# Patient Record
Sex: MALE | Race: WHITE | NOT HISPANIC OR LATINO | Employment: OTHER
[De-identification: names, ages, dates, MRNs, and addresses within clinical notes are randomized per-mention and may not be internally consistent; named-entity substitution may affect disease eponyms.]

---
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
## 2024-02-28 ENCOUNTER — OFFICE VISIT (OUTPATIENT)
Dept: SURGERY | Facility: CLINIC | Age: 19
End: 2024-02-28
Payer: COMMERCIAL

## 2024-02-28 VITALS
BODY MASS INDEX: 42.66 KG/M2 | HEIGHT: 72 IN | WEIGHT: 315 LBS | DIASTOLIC BLOOD PRESSURE: 64 MMHG | SYSTOLIC BLOOD PRESSURE: 116 MMHG

## 2024-02-28 DIAGNOSIS — E88.810 METABOLIC SYNDROME X: ICD-10-CM

## 2024-02-28 DIAGNOSIS — R62.50 DEVELOPMENTAL DELAY: ICD-10-CM

## 2024-02-28 DIAGNOSIS — E66.01 MORBID OBESITY WITH BMI OF 50.0-59.9, ADULT (H): Primary | ICD-10-CM

## 2024-02-28 PROCEDURE — 99214 OFFICE O/P EST MOD 30 MIN: CPT | Performed by: EMERGENCY MEDICINE

## 2024-02-28 NOTE — LETTER
2/28/2024         RE: Nnamdi Chavarria  [address] [place] [address]        Dear Colleague,    Thank you for referring your patient, Nnamdi Chavarria, to the [facility] [place]. Please see a copy of my visit note below.    Bariatric Clinic Follow-Up Visit:    Nnamdi Chavarria is a 18 year old  male with Body mass index is 57.76 kg/m .  presenting here today for follow-up on non-surgical efforts for weight loss. Original Intake visit occurred on 7/14/23 with a weight of 408 lbs and BMI of 56.1 with co-morbid fatty liver disease.  Along with diet and behavior changes, he has been using metformin and naltrexone to assist his weight loss goals.  He had trouble with medication compliance early on/daily use and out our 10/27/23 follow up had had limited success in managing appetite well.  As such we attempted to ramp up Wegovy at that visit (shortages currently can impeded access/supply) and he had not been able to start it as of his 11/15/23 dietician visit. 2/28/24 he is gaining weight as a result of poor control of his hunger disorder. We discussed start of Zepbound (tirzepatide) given lack of axis to both Wegovy and Saxenda, previous poor efficacy of phentermine/topamax (qsymia) and liraglutide in the past.  He struggles with remembering meds at his age/cognitive delay issues and single weekly dose is likely to improve compliance/efficacy with medications like Zepbound..      See his intake visit notes for details on identified contributors to weight gain in the past. Chart review shows 11/15/23 Dietician calculated RMR of 2730kcal/day and protein intake goal of 120g/day.    Weight:   Wt Readings from Last 5 Encounters:   02/28/24 (!) 190.5 kg (420 lb) (>99%, Z= 4.03)*   11/15/23 (!) 181 kg (399 lb) (>99%, Z= 3.86)*   10/27/23 (!) 185.1 kg (408 lb) (>99%, Z= 3.91)*   07/14/23 (!) 185.1 kg (408 lb) (>99%, Z= 3.87)*   06/11/20 (!) 161.1 kg (355 lb 2.6 oz) (>99%, Z=  3.94)*     * Growth percentiles are based on CDC (Boys, 2-20 Years) data.    pounds      Comorbidities:  Patient Active Problem List   Diagnosis     Severe obesity (BMI >= 40) (H)     Acanthosis nigricans     Developmental delay     History of seizures     Weakness of both lower extremities     Family history of chromosomal anomaly     Pain in both lower extremities     Scoliosis, unspecified scoliosis type, unspecified spinal region   Vitamin D Deficiency Screening Results:  Lab Results   Component Value Date    VITDT 18 (L) 07/14/2023    VITDT 21 06/11/2020    VITDT 15 (L) 06/13/2019    VITDT 23 06/21/2018         Current Outpatient Medications:      [START ON 5/24/2024] tirzepatide-Weight Management (ZEPBOUND) 10 MG/0.5ML prefilled pen, Inject 0.5 mLs (10 mg) Subcutaneous every 7 days for 180 days, Disp: 6 mL, Rfl: 1     [START ON 3/1/2024] tirzepatide-Weight Management (ZEPBOUND) 2.5 MG/0.5ML prefilled pen, Inject 0.5 mLs (2.5 mg) Subcutaneous every 7 days for 28 days, Disp: 2 mL, Rfl: 0     [START ON 3/29/2024] tirzepatide-Weight Management (ZEPBOUND) 5 MG/0.5ML prefilled pen, Inject 0.5 mLs (5 mg) Subcutaneous every 7 days for 28 days, Disp: 2 mL, Rfl: 0     [START ON 4/26/2024] tirzepatide-Weight Management (ZEPBOUND) 7.5 MG/0.5ML prefilled pen, Inject 0.5 mLs (7.5 mg) Subcutaneous every 7 days for 28 days, Disp: 2 mL, Rfl: 0     Semaglutide-Weight Management (WEGOVY) 2.4 MG/0.75ML pen, Inject 2.4 mg Subcutaneous every 7 days for 270 days 4 pens (Patient not taking: Reported on 2/28/2024), Disp: 3 mL, Rfl: 9      Interim: Since our last visit, he has not been able to find Wegovy. Struggling with hunger. Sedentary habits continued but tries to get out for occasional walks.     Plan:  To get back on track we're looking for 600-650kcal meals with 30-35 grams of lean protein. Start meals with protein first, then enjoy a variety of vegetables and complex carbohydrates. Meals every 5-6 hours will keep your energy  up and nourishment adequate.    2. Wegovy shortages have prevented use of this medication. Given previous intolerance of phentermine, low efficacy and compliance difficulty with metformin/naltrexone and daily compliance issues with Saxenda, we'll ramp up Zepbound if tolerated well and affordable: start 2.5mg/week for 4 weeks then 5mg/week for 4 weeks then 7.5mg/week for 4 weeks and then 10mg/week for 3-6 months before looking at further dose adjustments.    Stop Zepbound if any surgery needed, about 2-3 weeks prior to surgery ideally. Restart once feeling well. If more than 3-4 weeks off meds, we'll likely need to re-ramp.    Stop zepbound if severe abdominal pain/vomiting/unable to nourish your body.  See handout below.    3. Find 10-20 minutes to move your body for increase good health, aiming to build up to 150minutes or more weekly by July 4th.     4. Follow up with dietician.    5. I recommend a tracking moreno for mindfulness and to make sure your well nourished and hitting protein target of 120g/day and about 2300kcal/day (bare minimum of 1950).       We discussed [facility]ariat[facility] Basics including:  -eating 3 meals daily  -reviewed metabolic needs for weight loss based on Resting Metabolic Rate  -protein goals supportive of healthy weight loss  -avoiding/limiting calorie containing beverages  -We discussed the importance of restorative sleep and stress management in maintaining a healthy weight.  -We discussed the National Weight Control Registry healthy weight maintenance strategies and ways to optimize metabolism.  -We discussed the importance of physical activity including cardiovascular and strength training in maintaining a healthier weight and explored viable options.      Most recent labs:  Lab Results   Component Value Date    WBC 8.5 07/14/2023    HGB 13.3 07/14/2023    HCT 37.9 (L) 07/14/2023    MCV 84 07/14/2023     07/14/2023     Lab Results   Component Value Date    CHOL 89 06/11/2020  "    Lab Results   Component Value Date    HDL 37 (L) 06/11/2020     No components found for: \"LDLCALC\"  Lab Results   Component Value Date    TRIG 141 (H) 06/11/2020     No results found for: \"CHOLHDL\"  Lab Results   Component Value Date    ALT 47 07/14/2023    AST 32 07/14/2023    ALKPHOS 58 07/14/2023     No results found for: \"HGBA1C\"  Lab Results   Component Value Date    B12 580 07/14/2023     No components found for: \"VITDT1\"  No results found for: \"SADIA\"  Lab Results   Component Value Date    PTHI 38 07/14/2023     No results found for: \"ZN\"  No results found for: \"VIB1WB\"  Lab Results   Component Value Date    TSH 1.79 07/14/2023     No results found for: \"TEST\"    DIETARY HISTORY  Reviewed goals. Trying to stay away from liquid calories.      PHYSICAL ACTIVITY PATTERNS:  Cardiovascular: walks limited distances  Strength Training: no    REVIEW OF SYSTEMS  Depends on mom for many responses..  PHYSICAL EXAM:  Vitals: /64 (BP Location: Right arm, Patient Position: Sitting, Cuff Size: Adult Small)   Ht 1.816 m (5' 11.5\")   Wt (!) 190.5 kg (420 lb)   BMI 57.76 kg/m    Weight:   Wt Readings from Last 3 Encounters:   02/28/24 (!) 190.5 kg (420 lb) (>99%, Z= 4.03)*   11/15/23 (!) 181 kg (399 lb) (>99%, Z= 3.86)*   10/27/23 (!) 185.1 kg (408 lb) (>99%, Z= 3.91)*     * Growth percentiles are based on CDC (Boys, 2-20 Years) data.         GEN: Pleasant, well groomed, in no acute distress  HEENT:  acanthosis nigricans to face/neck .  NECK: No swelling.  HEART: RRR.  LUNGS: No respiratory difficulty noted. No cough. .  ABDOMEN: morbid obesity. Nontender abdomen. Reviewed areas of concern for pancreatitis, biliary stones or constipation. No distention..  EXTREMITIES: No tremor. Ambulation is independent..  NEURO: Alert, quiet, limited responses..  SKIN: No visible rashes. .    Interim study results:   Lab Results   Component Value Date    A1C 5.3 07/14/2023    A1C 5.2 06/11/2020    A1C 4.8 06/13/2019    A1C 5.4 " 06/21/2018     Last Comprehensive Metabolic Panel:  Sodium   Date Value Ref Range Status   07/14/2023 143 136 - 145 mmol/L Final   06/11/2020 141 133 - 143 mmol/L Final     Potassium   Date Value Ref Range Status   07/14/2023 4.1 3.4 - 5.3 mmol/L Final   06/11/2020 3.7 3.4 - 5.3 mmol/L Final     Chloride   Date Value Ref Range Status   07/14/2023 107 98 - 107 mmol/L Final   06/11/2020 110 98 - 110 mmol/L Final     Carbon Dioxide   Date Value Ref Range Status   06/11/2020 26 20 - 32 mmol/L Final     Carbon Dioxide (CO2)   Date Value Ref Range Status   07/14/2023 24 22 - 29 mmol/L Final     Anion Gap   Date Value Ref Range Status   07/14/2023 12 7 - 15 mmol/L Final   06/11/2020 5 3 - 14 mmol/L Final     Glucose   Date Value Ref Range Status   07/14/2023 106 (H) 70 - 99 mg/dL Final   06/11/2020 74 70 - 99 mg/dL Final     Urea Nitrogen   Date Value Ref Range Status   07/14/2023 8.7 6.0 - 20.0 mg/dL Final   06/11/2020 10 7 - 21 mg/dL Final     Creatinine   Date Value Ref Range Status   07/14/2023 0.78 0.67 - 1.17 mg/dL Final   06/11/2020 0.80 0.50 - 1.00 mg/dL Final     GFR Estimate   Date Value Ref Range Status   07/14/2023 >90 >60 mL/min/1.73m2 Final   06/11/2020 GFR not calculated, patient <18 years old. >60 mL/min/[1.73_m2] Final     Comment:     Non  GFR Calc  Starting 12/18/2018, serum creatinine based estimated GFR (eGFR) will be   calculated using the Chronic Kidney Disease Epidemiology Collaboration   (CKD-EPI) equation.       Calcium   Date Value Ref Range Status   07/14/2023 9.4 8.6 - 10.0 mg/dL Final   06/11/2020 9.0 8.5 - 10.1 mg/dL Final     Bilirubin Total   Date Value Ref Range Status   07/14/2023 0.5 <=1.2 mg/dL Final     Alkaline Phosphatase   Date Value Ref Range Status   07/14/2023 58 40 - 129 U/L Final     ALT   Date Value Ref Range Status   07/14/2023 47 0 - 50 U/L Final     Comment:     Reference intervals for this test were updated on 6/12/2023 to more accurately reflect our  healthy population. There may be differences in the flagging of prior results with similar values performed with this method. Interpretation of those prior results can be made in the context of the updated reference intervals.     06/11/2020 60 (H) 0 - 50 U/L Final     AST   Date Value Ref Range Status   07/14/2023 32 0 - 35 U/L Final     Comment:     Reference intervals for this test were updated on 6/12/2023 to more accurately reflect our healthy population. There may be differences in the flagging of prior results with similar values performed with this method. Interpretation of those prior results can be made in the context of the updated reference intervals.   06/11/2020 30 0 - 35 U/L Final               TSH   Date Value Ref Range Status   07/14/2023 1.79 0.50 - 4.30 uIU/mL Final     Recent Labs   Lab Test 06/11/20  1040 06/21/18  1501   CHOL 89 62   HDL 37* 34*   LDL 24 17   TRIG 141* 57     .      35 minutes spent by me on the date of the encounter doing chart review, history and exam, documentation and further activities per the note   Elias Palma MD  [facility]  7:57 AM  2/28/2024      Again, thank you for allowing me to participate in the care of your patient.        Sincerely,        Elias Palma MD

## 2024-02-28 NOTE — PROGRESS NOTES
Bariatric Clinic Follow-Up Visit:    Nnamdi Chavarria is a 18 year old  male with Body mass index is 57.76 kg/m .  presenting here today for follow-up on non-surgical efforts for weight loss. Original Intake visit occurred on 7/14/23 with a weight of 408 lbs and BMI of 56.1 with co-morbid fatty liver disease.  Along with diet and behavior changes, he has been using metformin and naltrexone to assist his weight loss goals.  He had trouble with medication compliance early on/daily use and out our 10/27/23 follow up had had limited success in managing appetite well.  As such we attempted to ramp up Wegovy at that visit (shortages currently can impeded access/supply) and he had not been able to start it as of his 11/15/23 dietician visit. 2/28/24 he is gaining weight as a result of poor control of his hunger disorder. We discussed start of Zepbound (tirzepatide) given lack of axis to both Wegovy and Saxenda, previous poor efficacy of phentermine/topamax (qsymia) and liraglutide in the past.  He struggles with remembering meds at his age/cognitive delay issues and single weekly dose is likely to improve compliance/efficacy with medications like Zepbound..      See his intake visit notes for details on identified contributors to weight gain in the past. Chart review shows 11/15/23 Dietician calculated RMR of 2730kcal/day and protein intake goal of 120g/day.    Weight:   Wt Readings from Last 5 Encounters:   02/28/24 (!) 190.5 kg (420 lb) (>99%, Z= 4.03)*   11/15/23 (!) 181 kg (399 lb) (>99%, Z= 3.86)*   10/27/23 (!) 185.1 kg (408 lb) (>99%, Z= 3.91)*   07/14/23 (!) 185.1 kg (408 lb) (>99%, Z= 3.87)*   06/11/20 (!) 161.1 kg (355 lb 2.6 oz) (>99%, Z= 3.94)*     * Growth percentiles are based on CDC (Boys, 2-20 Years) data.    pounds      Comorbidities:  Patient Active Problem List   Diagnosis    Severe obesity (BMI >= 40) (H)    Acanthosis nigricans    Developmental delay    History of seizures    Weakness of both lower  extremities    Family history of chromosomal anomaly    Pain in both lower extremities    Scoliosis, unspecified scoliosis type, unspecified spinal region   Vitamin D Deficiency Screening Results:  Lab Results   Component Value Date    VITDT 18 (L) 07/14/2023    VITDT 21 06/11/2020    VITDT 15 (L) 06/13/2019    VITDT 23 06/21/2018         Current Outpatient Medications:     [START ON 5/24/2024] tirzepatide-Weight Management (ZEPBOUND) 10 MG/0.5ML prefilled pen, Inject 0.5 mLs (10 mg) Subcutaneous every 7 days for 180 days, Disp: 6 mL, Rfl: 1    [START ON 3/1/2024] tirzepatide-Weight Management (ZEPBOUND) 2.5 MG/0.5ML prefilled pen, Inject 0.5 mLs (2.5 mg) Subcutaneous every 7 days for 28 days, Disp: 2 mL, Rfl: 0    [START ON 3/29/2024] tirzepatide-Weight Management (ZEPBOUND) 5 MG/0.5ML prefilled pen, Inject 0.5 mLs (5 mg) Subcutaneous every 7 days for 28 days, Disp: 2 mL, Rfl: 0    [START ON 4/26/2024] tirzepatide-Weight Management (ZEPBOUND) 7.5 MG/0.5ML prefilled pen, Inject 0.5 mLs (7.5 mg) Subcutaneous every 7 days for 28 days, Disp: 2 mL, Rfl: 0    Semaglutide-Weight Management (WEGOVY) 2.4 MG/0.75ML pen, Inject 2.4 mg Subcutaneous every 7 days for 270 days 4 pens (Patient not taking: Reported on 2/28/2024), Disp: 3 mL, Rfl: 9      Interim: Since our last visit, he has not been able to find Wegovy. Struggling with hunger. Sedentary habits continued but tries to get out for occasional walks.     Plan:  To get back on track we're looking for 600-650kcal meals with 30-35 grams of lean protein. Start meals with protein first, then enjoy a variety of vegetables and complex carbohydrates. Meals every 5-6 hours will keep your energy up and nourishment adequate.    2. Wegovy shortages have prevented use of this medication. Given previous intolerance of phentermine, low efficacy and compliance difficulty with metformin/naltrexone and daily compliance issues with Saxenda, we'll ramp up Zepbound if tolerated well and  "affordable: start 2.5mg/week for 4 weeks then 5mg/week for 4 weeks then 7.5mg/week for 4 weeks and then 10mg/week for 3-6 months before looking at further dose adjustments.    Stop Zepbound if any surgery needed, about 2-3 weeks prior to surgery ideally. Restart once feeling well. If more than 3-4 weeks off meds, we'll likely need to re-ramp.    Stop zepbound if severe abdominal pain/vomiting/unable to nourish your body.  See handout below.    3. Find 10-20 minutes to move your body for increase good health, aiming to build up to 150minutes or more weekly by July 4th.     4. Follow up with dietician.    5. I recommend a tracking moreno for mindfulness and to make sure your well nourished and hitting protein target of 120g/day and about 2300kcal/day (bare minimum of 1950).       We discussed [facility] Basics including:  -eating 3 meals daily  -reviewed metabolic needs for weight loss based on Resting Metabolic Rate  -protein goals supportive of healthy weight loss  -avoiding/limiting calorie containing beverages  -We discussed the importance of restorative sleep and stress management in maintaining a healthy weight.  -We discussed the National Weight Control Registry healthy weight maintenance strategies and ways to optimize metabolism.  -We discussed the importance of physical activity including cardiovascular and strength training in maintaining a healthier weight and explored viable options.      Most recent labs:  Lab Results   Component Value Date    WBC 8.5 07/14/2023    HGB 13.3 07/14/2023    HCT 37.9 (L) 07/14/2023    MCV 84 07/14/2023     07/14/2023     Lab Results   Component Value Date    CHOL 89 06/11/2020     Lab Results   Component Value Date    HDL 37 (L) 06/11/2020     No components found for: \"LDLCALC\"  Lab Results   Component Value Date    TRIG 141 (H) 06/11/2020     No results found for: \"CHOLHDL\"  Lab Results   Component Value Date    ALT 47 07/14/2023    AST 32 07/14/2023    " "ALKPHOS 58 07/14/2023     No results found for: \"HGBA1C\"  Lab Results   Component Value Date    B12 580 07/14/2023     No components found for: \"VITDT1\"  No results found for: \"SADIA\"  Lab Results   Component Value Date    PTHI 38 07/14/2023     No results found for: \"ZN\"  No results found for: \"VIB1WB\"  Lab Results   Component Value Date    TSH 1.79 07/14/2023     No results found for: \"TEST\"    DIETARY HISTORY  Reviewed goals. Trying to stay away from liquid calories.      PHYSICAL ACTIVITY PATTERNS:  Cardiovascular: walks limited distances  Strength Training: no    REVIEW OF SYSTEMS  Depends on mom for many responses..  PHYSICAL EXAM:  Vitals: /64 (BP Location: Right arm, Patient Position: Sitting, Cuff Size: Adult Small)   Ht 1.816 m (5' 11.5\")   Wt (!) 190.5 kg (420 lb)   BMI 57.76 kg/m    Weight:   Wt Readings from Last 3 Encounters:   02/28/24 (!) 190.5 kg (420 lb) (>99%, Z= 4.03)*   11/15/23 (!) 181 kg (399 lb) (>99%, Z= 3.86)*   10/27/23 (!) 185.1 kg (408 lb) (>99%, Z= 3.91)*     * Growth percentiles are based on CDC (Boys, 2-20 Years) data.         GEN: Pleasant, well groomed, in no acute distress  HEENT:  acanthosis nigricans to face/neck .  NECK: No swelling.  HEART: RRR.  LUNGS: No respiratory difficulty noted. No cough. .  ABDOMEN: morbid obesity. Nontender abdomen. Reviewed areas of concern for pancreatitis, biliary stones or constipation. No distention..  EXTREMITIES: No tremor. Ambulation is independent..  NEURO: Alert, quiet, limited responses..  SKIN: No visible rashes. .    Interim study results:   Lab Results   Component Value Date    A1C 5.3 07/14/2023    A1C 5.2 06/11/2020    A1C 4.8 06/13/2019    A1C 5.4 06/21/2018     Last Comprehensive Metabolic Panel:  Sodium   Date Value Ref Range Status   07/14/2023 143 136 - 145 mmol/L Final   06/11/2020 141 133 - 143 mmol/L Final     Potassium   Date Value Ref Range Status   07/14/2023 4.1 3.4 - 5.3 mmol/L Final   06/11/2020 3.7 3.4 - 5.3 mmol/L " Final     Chloride   Date Value Ref Range Status   07/14/2023 107 98 - 107 mmol/L Final   06/11/2020 110 98 - 110 mmol/L Final     Carbon Dioxide   Date Value Ref Range Status   06/11/2020 26 20 - 32 mmol/L Final     Carbon Dioxide (CO2)   Date Value Ref Range Status   07/14/2023 24 22 - 29 mmol/L Final     Anion Gap   Date Value Ref Range Status   07/14/2023 12 7 - 15 mmol/L Final   06/11/2020 5 3 - 14 mmol/L Final     Glucose   Date Value Ref Range Status   07/14/2023 106 (H) 70 - 99 mg/dL Final   06/11/2020 74 70 - 99 mg/dL Final     Urea Nitrogen   Date Value Ref Range Status   07/14/2023 8.7 6.0 - 20.0 mg/dL Final   06/11/2020 10 7 - 21 mg/dL Final     Creatinine   Date Value Ref Range Status   07/14/2023 0.78 0.67 - 1.17 mg/dL Final   06/11/2020 0.80 0.50 - 1.00 mg/dL Final     GFR Estimate   Date Value Ref Range Status   07/14/2023 >90 >60 mL/min/1.73m2 Final   06/11/2020 GFR not calculated, patient <18 years old. >60 mL/min/[1.73_m2] Final     Comment:     Non  GFR Calc  Starting 12/18/2018, serum creatinine based estimated GFR (eGFR) will be   calculated using the Chronic Kidney Disease Epidemiology Collaboration   (CKD-EPI) equation.       Calcium   Date Value Ref Range Status   07/14/2023 9.4 8.6 - 10.0 mg/dL Final   06/11/2020 9.0 8.5 - 10.1 mg/dL Final     Bilirubin Total   Date Value Ref Range Status   07/14/2023 0.5 <=1.2 mg/dL Final     Alkaline Phosphatase   Date Value Ref Range Status   07/14/2023 58 40 - 129 U/L Final     ALT   Date Value Ref Range Status   07/14/2023 47 0 - 50 U/L Final     Comment:     Reference intervals for this test were updated on 6/12/2023 to more accurately reflect our healthy population. There may be differences in the flagging of prior results with similar values performed with this method. Interpretation of those prior results can be made in the context of the updated reference intervals.     06/11/2020 60 (H) 0 - 50 U/L Final     AST   Date Value Ref  Range Status   07/14/2023 32 0 - 35 U/L Final     Comment:     Reference intervals for this test were updated on 6/12/2023 to more accurately reflect our healthy population. There may be differences in the flagging of prior results with similar values performed with this method. Interpretation of those prior results can be made in the context of the updated reference intervals.   06/11/2020 30 0 - 35 U/L Final               TSH   Date Value Ref Range Status   07/14/2023 1.79 0.50 - 4.30 uIU/mL Final     Recent Labs   Lab Test 06/11/20  1040 06/21/18  1501   CHOL 89 62   HDL 37* 34*   LDL 24 17   TRIG 141* 57     .      35 minutes spent by me on the date of the encounter doing chart review, history and exam, documentation and further activities per the note   Elias Palma MD  [facility]  7:57 AM  2/28/2024

## 2024-02-28 NOTE — PATIENT INSTRUCTIONS
Plan:  To get back on track we're looking for 600-650kcal meals with 30-35 grams of lean protein. Start meals with protein first, then enjoy a variety of vegetables and complex carbohydrates. Meals every 5-6 hours will keep your energy up and nourishment adequate.    2. Wegovy shortages have prevented use of this medication. Given previous intolerance of phentermine, low efficacy and compliance difficulty with metformin/naltrexone and daily compliance issues with Saxenda, we'll ramp up Zepbound if tolerated well and affordable: start 2.5mg/week for 4 weeks then 5mg/week for 4 weeks then 7.5mg/week for 4 weeks and then 10mg/week for 3-6 months before looking at further dose adjustments.    Stop Zepbound if any surgery needed, about 2-3 weeks prior to surgery ideally. Restart once feeling well. If more than 3-4 weeks off meds, we'll likely need to re-ramp.    Stop zepbound if severe abdominal pain/vomiting/unable to nourish your body.  See handout below.    3. Find 10-20 minutes to move your body for increase good health, aiming to build up to 150minutes or more weekly by July 4th.     4. Follow up with dietician.    5. I recommend a tracking moreno for mindfulness and to make sure your well nourished and hitting protein target of 120g/day and about 2300kcal/day (bare minimum of 1950).       LEAN PROTEIN SOURCES  Getting 20-30 grams of protein, 3 meals daily, is appropriate for most people, some need more but more than about 40 grams per meal is not useful.  General rule is drinking one ounce of water per gram of protein eaten over the course of the day:  70 grams of protein each day, drink 70 oz of water.  Protein Source Portion Calories Grams of Protein                           Nonfat, plain Greek yogurt    (10 grams sugar or less) 3/4 cup (6 oz)  12-17   Light Yogurt (10 grams sugar or less) 3/4 cup (6 oz)  6-8   Protein Shake 1 shake 110-180 15-30   Skim/1% Milk or lactose-free milk 1 cup ( 8 oz)   8   Plain or light, flavored soymilk 1 cup  7-8   Plain or light, hemp milk 1 cup 110 6   Fat Free or 1% Cottage Cheese 1/2 cup 90 15   Part skim ricotta cheese 1/2 cup 100 14   Part skim or reduced fat cheese slices 1 ounce 65-80 8     Mozzarella String Cheese 1 80 8   Canned tuna, chicken, crab or salmon  (canned in water)  1/2 cup 100 15-20   White fish (broiled, grilled, baked) 3 ounces 100 21   [place]/Tuna (broiled, grilled, baked) 3 ounces 150-180 21   Shrimp, Scallops, Lobster, Crab 3 ounces 100 21   Pork loin, Pork Tenderloin 3 ounces 150 21   Boneless, skinless chicken /turkey breast                          (broiled, grilled, baked) 3 ounces 120 21   [place], Hand, [place], and Venison 3 ounces 120 21   Lean cuts of red meat and pork (sirloin,   round, tenderloin, flank, ground 93%-96%) 3 ounces 170 21   Lean or Extra Lean Ground Turkey 1/2 cup 150 20   90-95% Lean [place] Burger 1 lani 140-180 21   Low-fat casserole with lean meat 3/4 cup 200 17   Luncheon Meats                                                        (turkey, lean ham, roast beef, chicken) 3 ounces 100 21   Egg (boiled, poached, scrambled) 1 Egg 60 7   Egg Substitute 1/2 cup 70 10   Nuts (limit to 1 serving per day)  3 Tbsp. 150 7   Nut [place] (peanut, almond)  Limit to 1 serving or less daily 1 Tbsp. 90 4   Soy Burger (varies) 1  15   Garbanzo, Black, [place] Beans 1/2 cup 110 7   Refried Beans 1/2 cup 100 7   Kidney and Lima beans 1/2 cup 110 7   Tempeh 3 oz 175 18   Vegan crumbles 1/2 cup 100 14   Tofu 1/2 cup 110 14   Chili (beans and extra lean beef or turkey) 1 cup 200 23   Lentil Stew/Soup 1 cup 150 12   Black Bean Soup 1 cup 175 12           On-the-Go Breakfast Ideas  As of 2015, the latest research shows what a huge impact eating breakfast has on losing weight and feeling your best. People lose more weight when they make breakfast their biggest meal of the day compared to Dinner, but even if you cannot go to that degree,  getting a breakfast that has at least 20 grams of protein and even a moderate amount of fat is ideal for maintaining good energy through the day and limits overeating in the evening hours.  The following are some quick and easy suggestions for at least getting something of substance into your body in the morning.  Enjoy!    Eating breakfast within 90 minutes of waking up is an important part of taking care of your body on a restricted calorie diet plan.  After sleeping for hours, your body is in need of fuel.  An ideal breakfast is a combination of protein, whole-grain carbohydrates, or fruit.  Here s why:    -Protein digests very slowly in the body, helping you feel more satisfied.  -Whole grains provide dietary fiber, which also digests slowly and helps keep your gut clean.  -Fruit is a great source of vitamins, minerals, and fiber.     Each one of these breakfast combinations has between 200-300 calories and 15-20 grams of protein.  Feel free to mix and match!    Bone Broth (chicken bone broth or beef bone broth) is a great way to boost protein content. 8oz of bone broth will typically have 9-12grams of protein for 40kcal of energy.    Protein: Choose  -1/2 cup low-fat cottage cheese  -2 hard boiled eggs , or one cooked in olive oil (low/slow heat).  -1 low fat string cheese stick  -1 Tableson natural peanut butter  -[employer] vegetarian sausage lani (found in freezer section)  -1 slice lowfat cheese  -6 oz 2% or lowfat Greek yogurt, such as Fage or Oikos.    PLUS    Whole Grains:  Choose   -1 whole wheat English muffin  -1 whole wheat dunia, half  -1/2 Fiber One frozen muffin, thawed  -1/2 Fiber One toaster pastry  -1 whole wheat bagel thin  -1/2 cup Kashi cereal  -1 Kashi waffle (or other whole grain high-fiber waffle)  Aim for whole grain/sprouted breads with at least 3g of fiber/slice if having bread. [place] is one such brand.    OR    Fruit: Choose  -1/3 cup blueberries  -1/2 banana (or a  plantain- similar to a banana, yet smaller)  -1/2 cup cantaloupe cubes  -1 small apple  -1 small orange  -1/2 cup strawberries  -handful raspberries/blackberries (each berry is about 1 calorie).    *Adapted from [employer], Fall 20    Ten Breakfasts Under 250 calories    Ideally, getting between 350-600 calories  (depending on starting height and weight)for breakfast is ideal for avoiding hunger later in the day, adjust/add to the following accordingly:    One- 250 calories, 8.5 g protein  1 slice whole-grain toast   1 Tbsp peanut butter    banana    Two- 250 calories, 8 g protein    cup nonfat/lowfat yogurt  1/3rd cup diced no-sugar peaches  1/3rd cup cereal (like Special K, Cheerios, or bran flakes)    Three- 250 calories, 25 g protein  1 egg scrambled with 1 oz skim milk    cup shredded cheddar    whole grain English muffin  1 oz [place] iglesias  1 tsp margarine spread    Four- 225 calories, 25 g protein  1/2 cup Kashi Go-Lean cereal    cup skim milk mixed with 1 scoop Bariatric Advantage protein powder    cup no-sugar diced pears    Five- 250 calories, 20 g protein    cup oatmeal prepared with skim milk, 1 scoop protein powder, and sugar-free maple syrup    Six- 200 calories, 5 g protein  1 whole grain waffle, toasted  1 tablespoon creamy peanut or almond butter    Seven-  250 calories, 19 g protein  Breakfast sandwich: 1 slice whole grain toast, cut in half.  Add 1 scrambled egg and one slice cheddar  cheese.    Eight-  250 calories, 15 g protein  2 eggs scrambled with 1/3 cup frozen spinach (heat before adding to eggs) and 2 tablespoons low fat cream cheese.    Nine-  150 calories, 15 g protein  2/3rd cup cottage cheese    cup cantaloupe    Ten- 200 calories, 20 g protein  Fruit smoothie made with 4 oz. nonfat Greek yogurt,   cup berries, 1 scoop protein powder, and 4 oz skim milk.    Ten Lunches Under 250 Calories    Aim for lunch to be around 300-400 calories a day when trying to lose weight and get that  protein in!    One- 200 calories, 11 g protein  1/3 cup tuna salad made with light vazquez on 1 slice whole grain bread  1 small peeled apple    Two- 250 calories, 16 g protein  1/3 cup lowfat cottage cheese    cup cooked green beans    small fruit cocktail (in natural juice)    Three- 200 calories, 11 g protein    grilled cheese sandwich on whole grain bread with lowfat cheese  2/3rd cup of tomato soup    Four- 250 calories, 22 g protein  Deli wrap: 1 oz sliced turkey, 1 oz sliced ham, 1 oz sliced chicken rolled up with 1 slice low-fat cheese  1 small orange    Five- 250 calories, 28 g protein  2/3rd cup chili with 1 oz shredded cheese  4 saltine crackers    Six- 250 calories, 22 g protein  1 cup fresh spinach with 2 oz chicken, 1/3rd cup mandarin oranges, and 2 tablespoons sliced almonds with 1 tablespoon  vinaigrette dressing    Seven- 200 calories, 11 g protein  1 Tbsp sugar-free preserves and 1 Tbsp peanut butter on 1 slice whole grain toast    cup nonfat/lowfat Greek yogurt    Eight- 250 calories, 18 g protein  1 small soft-shell chicken taco with 1 oz shredded cheese, lettuce, tomato, salsa, and 1 Tbsp light sour cream    cup black beans    Nine- 225 calories, 13 g protein  2 ounces baked chicken  1/4 cup mashed potatoes    cup green beans    Ten- 200 calories, 21 g protein  Deli dunia: 2 oz roast beef or other deli meat with 1 tsp Abhi mayonnaise and sliced tomato, onion, and lettuce  1/3rd cup cottage cheese      Ten Dinners Under 300 calories    If you're eating a large breakfast and medium lunch, keep dinner small.  300-400 calories is ideal for most people depending on their caloric needs.    One- 300 calories, 12 g protein  1-inch thick slice of turkey meatloaf    cup baked butternut squash    Two- 200 calories, 9 g protein  Bread-less BLT: 3 slices turkey iglesias, sliced tomato, wrapped in a large lettuce leaf    cup peeled fruit    Three- 275 calories, 36 g protein  3 oz roasted chicken    cup cooked  broccoli    cup shredded cheddar cheese    cup unsweetened applesauce    Four- 200 calories, 25 g protein  3 oz baked tilapia  1/3rd cup cooked carrots    cup yogurt    Five- 250 calories, 20 g protein  Grilled ham  n  Swiss: spread 2 tsp ghee or butter on 1 slice of whole grain bread.  Cut bread in half, layer 2 oz deli ham with 1 piece of Swiss cheese and grill until cheese is melted.    cup cooked vegetables    Six- 250 calories, 18 g protein  Vegetarian cheeseburger: 1 [place] cheeseburger topped with lettuce, onion, tomato, and ketchup/mustard    cup sweet potato fries    Seven- 250 calories, 18 g protein  Pork pot roast: 2 oz roasted pork loin, 1/3rd cup roasted carrots,   medium potato, cooked with   cup gravy    Eight- 330 calories, 25 g protein  2 oz meatballs (about 2 small meatballs)    cup spaghetti sauce  1/2 piece toast topped with 1 tsp ghee or butterand topped with garlic powder, toasted in oven    Nine- 250 calories, 16 g protein  Mexican pizza: one 8  corn tortilla topped with 2 oz chicken,   cup salsa, 2 tablespoons black beans, 2 tablespoons shredded cheese.  Bake until cheese is melted.    Ten- 250 calories, 22 g protein  Shrimp stir-yen: 3 oz cooked shrimp, 1/6th onion,   pepper,   cup chopped carrots sautéed in 1 tablespoon olive oil, topped with 2 tablespoons stir yen sauce and a pinch of sesame seeds        150 Calories or Less Snack Ideas   1 hardboiled egg with   cup berries  1 small apple with 1 hardboiled egg  10 almonds with   cup berries  2 clementines with 1 light string cheese  1 light string cheese with   sliced apple  1 light string cheese wrapped in 2 slices of turkey  4 100% whole wheat crackers (e.g. Triscuit) with 1 light string cheese    c. cottage cheese with   cup fruit and 1 Tbsp sunflower seeds     cup cottage cheese with   of an avocado     can tuna fish with 1 cup sliced cucumbers     cup roasted garbanzo beans with paprika and cayenne pepper    baked sweet potato with    cup chili beans or   cup cottage cheese  2 oz. nitrate free turkey slices with 1 cup carrots  1 container (6 oz) of low sugar (less than 10 grams of sugar) greek yogurt   3 Tablespoons of hummus with 1 cup sliced bell peppers   2 Tablespoons of hummus with 15 baby carrots  4 Tablespoons ranch dip made with plain Greek Yogurt and 3 mini cucumbers  1/4 cup nuts (any kind)  1 Tablespoon peanut butter with 1 stalk celery   1 dill pickle wrapped in 1-2 slices of deli ham with 1 tsp of mayonnaise/mustard.        Example Meal Plan for a 1251-3654 Calorie Diet:    In order to fuel your weight loss properly and avoid hunger-induced overeating later in the day, for your height and weight, you will enjoy the most success by following the diet below or similar with adjustments based on your particular tastes and preferences.  Exercise may influence speed, amount of weight loss further.     I recommend getting into a meal routine and keeping it similar day to day in the beginning so you don t have to think too hard about what you re going to make/eat.  Keep snacks healthy, ideally containing protein and some vegetables.  Non-processed food is preferable to packaged items.  Eat at least a few crunchy green vegetables if having a snack, which should be 2-3 hours after your mealtimes(prepare these ahead of time for ease of use).  Drink 64 oz -80 oz of water daily for most, some of you will need more and we'll discuss it at your visit if that is the case.      When changing our diet,  we can often mistake thirst for hunger or just have some distracted eating habits that we need to break free from ('bored/mindless eating', screen time,work, driving,etc).  A glass of water and reconsideration of our hunger is often all that is needed.  Having the urge is not the problem, but watching it pass by without acting on it is the goal.    If you re having hunger problems, add a protein drink/snack to your morning hours or afternoon snack with  at least 20grams of protein and not too much sugar (under 10g).  A carton of higher protein/low sugar yogurt can work as well.  If the urge to snack is overwhelming and not satiated, try going for a 10 minute walk/exercise, come home and drink a glass of water and if still hungry, have a  calorie snack (handful of raw/sprouted nuts, veggies and string cheese, protein bar, etc).  Savor it.    It is better to have a large breakfast, a moderate lunch and a smaller dinner to fuel your day.  People lose 10-15% more weight during their weight loss season with this strategy. Optimizing your protein intake at each meal will further keep you more satisfied while eating less food overall.  Getting exercise in early has also been shown to offer the best results (before breakfast ideally but anytime is the right time to exercise if that is not an option for you).    To make sure you re getting adequate vitamins and minerals during weight loss, I recommend one complete multivitamin a day of your choice.  Consider a probiotic and taking some vitamin D 2000 IU daily.    Let supper be your last meal of the day and ideally try to have at least 12 hours between supper and breakfast the next day to tap into some beneficial overnight fasting dynamics.  Midnight snacks need to go away. Water in the evening is fine, unsweetened, non caffeinated herbal tea is helpful as well.  Consolidating your meals within a 8-12 hour period of your day will help tap into these additional metabolic benefits and tends to keep your appetite up for breakfast, further helping to stay on track.  For most of my patients, I don't recommend an intermittent fasting style diet (many find it hard to fit in their lifestyle) but an overnight fast is very doable for most patients and helps regulate our hunger drives a little better.  This makes it very important to nail good intake at all three meals to feel satisfied/energized and still lose weight.      If  evening snacking desires are high, consider a glass of fiber supplement for some additional fullness (metamucil or similar). Most of us don't get the 25-30 grams per day of fiber that promotes good gut health/satiety.  Benefiber, metamucil, citrucel are reasonable/affordable options for most people.  Inulin, chicory, psyllium husk are reasonable options but start slow and low in the dose to avoid gas/bloating until your gut gets acclimated (ramping up to 5-10 grams per day of supplemental fiber after 3-4 weeks if needed).      Example Meal Plan:  Breakfast: 450-475 Calories  1 egg cooked on low in olive oil:   calories.  5oz Greek Yogurt (Fage plain classic: ~150 partha)  Handful of Berries of your choice (about a calorie per berry or 20-40cal per handful)    cup(cooked) of  old fashioned oatmeal or 1/2 cup(cooked) steel cut oats. (150 partha)  Sprinkle amount of brown sugar and a pat of butter. (40 partha)  Glass of  Water  Black coffee or unsweetened Tea (0calories).      2-3 hours Later Snack: (195 calories).  Glass of water  One string Cheese (80 calories) or 4 oz creamed cottage cheese (115 calories) with  Crunchy Celery sticks (less than 10 calories per large stalk) 2 stalks. (20 calories)    of a  Large Banana or   of a Large Apple (60 calories):  eat second half at lunch or afternoon snack.     Lunch:300 -350 calories   Chicken Breast  (baked/broiled/roasted/grilled)  4-6 oz.  (125-180 partha), BBQ sauce/hot sauce/mustard/seasoning is free. Just use a reasonable amount. Or a can of tuna with 1 tablespoon mayonnaise.  Salad: lettuce, any other veggies (cucumbers, green peppers/celery you like and a small drizzle of dressing to just flavor.  Go as big on the veggies as you like,  as they are practically calorie free.   A whole, 8 inch cucumber is 45 calories, a whole green pepper is 23 calories, a stalk of celery is 9 calories.  [place] Dressing is 60 calories per tablespoon..so moderate your desired  dressing or do a drizzle of olive oil and splash of balsamic vinegar on top,  Total calories unlikely to be over 150 even with dressing.  Glass of Water.    Option for lunch is meal replacement protein drink/smoothie.  Need at least 20 grams of protein and eat the rest of your apple/banana from the morning snack.      Afternoon Snack: 150-200 calories   Cheese Stick or cottage cheese again  and a fresh fruit OR  Granola Bar (protein Bar acceptable if under 200 calories OR  Homemade smoothies:  8oz skim milk,  a handful of berries (fresh or frozen and a serving of protein powder such as BiPro or Natali sWhey for example.  If you don't like dairy, make with 8oz water, one small banana, handful of berries and the protein powder, add any veggies you want as well:  roughly 200 calories.   Glass of Water    Dinner: 325 calories  4oz of fresh, [place] salmon.  Broiled (salt/pepper/dill) for about 8-8.5 minutes (200calories) or  4oz filet mignon steak or sirloin steak  Salad or vegetable sautéed lightly in olive oil or   Broccoli 1.5  cups chopped and steamed  or micro-waved in a little water (75 calories)  Glass of Water,    Cup of herbal tea (unsweetened, caffeine free)      Herbs and seasonings are encouraged to flavor your foods/vegetables.  Make your food delicious.      Tips for Success:  1.  Prepare proteins ahead of time (broil chicken breasts in bulk so you can grab and go), steel cut oats/lentils can be stored in casserole dish/bowl in the fridge for quick scoop in the morning and rewarm in microwave, make use of crock pot recipes (watch salt content).  Making meals that cover 3-4 future meals is an easy way to stay on track.  2.  Drink a 8-12 oz glass of water every 2-3 hours when awake.  We often mistake hunger for thirst, especially when losing weight.  3. Remember your Reward and Motivation when things get hard.  4.  Weigh yourself every morning and record, you'll stay on track better and learn how our  "biorhythms, diet and elimination patterns show up on the scale. Don't worry about 1 or 2 day patterns, but when on track you'll notice good trend downward of weight over 3-4 day segments.  Plateaus tend to resolve after 4-8 days in most cases if you stay consistent with your plan.  These are natural and part of weight loss, even if you're perfect with your plan execution.  5. Call if problems/concerns.  [employer] is a great tool to stay in touch and provide weekly outside accountability. Check in with questions or if you want to brag.  6.  Find a handful of meals/foods that keep you on track and feeling good and get into a routine that is sustainable for you.  It's OK to have a routine that works for you.  7.  Consider taking a complete multivitamin just to make sure all micronutrients are adequate during weight loss.  8. If losing hair/brittle nails it usually means you are not taking enough protein.  Minimum goal is 60 grams daily of protein for smaller women, 80 grams a day for men. Consider taking Biotin as supplement or a \"Hair and Nail\" multivitamin.      Nnamdi, increase portions by 35-50% to hit your intake target.          Zepbound (Tirzepatide) is a very effective satiety boosting appetite suppressant that elevates satiety hormones GLP1 and GIP. It needs to be ramped up slowly to be tolerated adequately.  About 1/10 people will not tolerate this medication. Each month, you move up to a higher dose until eventually reaching the 10mg/week dose if tolerated with further ramping to follow if needed. If intolerant or severe side effects, a dose decrease would be wise, so keep me posted if not tolerated the ramping well. This may be a longer term medication based on individual needs/physiology and appetite control.     Injections can be given after cleansing the skin with alcohol prep pad or swab (available OTC).     Stop Zepbound if severe abdominal pain/vomiting/rash/throat swelling or constant nausea that " prevents adequate food/water intake. Stop 2-3 weeks prior to any planned general anesthesia surgeries to reduce risk for something called a post operative ileus.     Gallstones can occur in about 1% of patients on this medication so update me if increase right upper abdominal pain after eating.     Start meals with protein first, separate beverages from meals by 20 minutes and work hard in between meals to get your 64-75 oz of water daily to reduce risks for severe constipation. Consider a fiber supplement like powdered psyllium husk in 12 oz water each night, stool softerners as needed and Miralax or milk of Magnesia if more than 3 days have passed without a Bowel Movement.     Check out [facility] for patient resources.  If you have weekends off, I recommend dosing Friday evenings.     Some people starve on this medication if not mindful about food intake. I recommend starting meals with the protein part of your meal first, chew thoroughly and separate beverages from meals by about 20 minutes to make sure you get your nourishment in first. Include vegetables/complex carbohydrates and unsaturated fat as part of your balanced diet but group these at the end of the meal, after your protein is mostly gone. Satiety will kick in too early if drinking too much with meals and under-nourishment can result.     It's not a bad idea to take a complete multivitamin most days of the week if using this medication. Adequate hydration is essential for feeling your best, efficient fat burning, waste elimination and constipation prevention. For those without fluid restrictions due to other disease, the goal is at least one ounce of water per gram of protein consumed with a  minimum of 64oz/day goal. Stool softners and fiber supplements as well as occasional laxative use (miralax, etc) may be necessary if getting too constipated.    Pancreatitis is a very rare but potentially serious side effect. Stop Zepbound if severe mid  abdominal pain/burning in nature or if unable to eat/drink due to severe nausea/discomfort.   People with strong history of pancreatitis without clear cause should stay clear of this medication as should those planning to get pregnant, those with strong personal or family history for medullary thyroid cancer or Multiple Endocrine Neoplasia (rare).     Kind Regards,  Elias Palma MD  [facility]        MEDICATIONS FOR WEIGHT LOSS  There are several medications available to assist us in weight loss.  By themselves, without a mindful change in diet and increase in movement/activity these medications are disappointing in their results. However, combined with a closely monitored program of diet change and exercise they can be very effective in controlling appetite and boosting initial weight loss.  All weight loss medications need continual re-evaluation for efficacy as their side effects and health benefits fail to be worthwhile if a person is not continuing to lose weight or in maintaining their healthy weight.  Some weight loss medications are scheduled drugs, meaning there is at least a theoretical possibility for developing addiction to them, but in practice this is rare.  We do anticipate coming off meds in the future- after stabilization of weight loss is assurred.  Finally, a tolerance can develop and people s perceived efficacy of medication can diminish.  In communication with your physician, it may be appropriate to intermittently take a break from these medications and then restart again (few weeks off then restart again) if a plateau is reached that cannot be broken through.  Each person can respond to a medication differently and to be a good option for you, it will need to be affordable, effective and well tolerated with minimal side effects.    In most cases, weight loss progress after one month and three months will be obtained and if a patient is not reaching the  satisfactory progress towards weight loss, the medications may be discontinued.  The thought is that if a person is taking a weight loss medication and not receiving the potential health benefit of that drug, the side effects are not worthwhile and use should be discontinued.  On the flip side, there are many people on some weight loss medications for years because it continues to be an effective tool in their weight management and they are tolerating the medication without any long-term side effects.  Each person's response and purpose will be evaluated.      PHENTERMINE (Adipex): approved in 1959 for appetite suppression.  It has stimulant effects and cannot be used with Ritalin, Concerta, or other stimulants.  Although it is not highly addictive, it's chemically related to amphetamines which are addictive and is classified as a Controlled Substance by the [place].  Occasional dependence can develop, but rarely. The most common side effects are dry mouth, increased energy and concentration, increased pulse, and constipation.  You should not take phentermine if you have glaucoma, hyperthyroidism, or uncontrolled/untreated hypertension or overly anxious. You should stop if dramatic mood swings, severe insomnia, palpations, chest pains, visual changes or if your Blood Pressure is consistently elevated or any time it's over 160/90.   It's ok to go off the med for a few weeks and restart if efficacy is wearing off.  $24-$30 for 90 tablets at [facility]. Females are required to have reliable birth control to reduce the risk birth defects/miscarriage.      TOPIRAMATE (Topamax): Anti-seizure medication, also used to prevent migraines and sometimes for mood stabilization.  Side effects include paresthesia, glaucoma, altered concentration, attention difficulties, memory and speech problems, metabolic acidosis, depression, increase in body temperature and decrease sweating, risk of kidney stones.  Do not take Topamax while  taking Depakote as this can cause high ammonia levels.  You must have reliable birth control as Topamax can cause birth defects.  If prolonged use has occurred it should be tapered off slowly to avoid withdrawal issues.  Insurance usually covers Topiramate.  At higher doses, there may be some confusion/forgetfulness associated with this so we try to limit dose to under 75mg twice daily to reduce this risk. Often covered by insurance as it's used for many reasons.  Topamax will cause carbonated beverages to taste bad. A recheck of your kidney/electrolytes may occur within a few months of starting.    QSYMIA (Phentermine + Topamax extended release):  See above information about phentermine and Topamax.  Most common side effects are paresthesia, dizziness, distortion of taste, insomnia, constipation, and dry mouth.  See above descriptions for the two individual agents.Females are required to have reliable birth control to reduce the risk birth defects/miscarriage.  $100-200 per month but coupons/programs exist at Qsymia.com that may reduce costs depending on a patient's coverage. Has  a low, medium and higher dosing option and usually titrating upwards is expected for continued good benefit and consideration for tapering downward or using lower dose in stabilization phases.      GLP1 Agonists:  Liraglutide (Victoza/Saxenda), Semaglutide (Ozempic/Wegovy):   Part of the family of Glucagon Like Peptide Agonists, these medications directly suppresses appetite and are often used by diabetic patients due to improvements in glucose/insulin balance.  They also slow how quickly the stomach empties, increasing fullness. They may be hard to get covered for non diabetics and some plans have exclusions for weight loss purposes.  Currently, these are  injectable medications delivered via autoinjector pen. It can be very costly without insurance coverage (over $500/month).  Small risks for pancreatitis exists and dose should be held  if increased mid abdominal pain/burning. It is not to be used if previous Multiple Endocrine Neoplasia. In rodents, may increase risk of thyroid tumors and not indicated for anyone with a history of medullary thyroid cancer as a result.  If changes in voice/swallowing should be discontinued. Reliable birth control required in women. Saxenda.com, Wegovy.com has more information on these medications.    Zepbound (Tirzepatide):  Similar in many ways to Wegovy/Saxenda type products, works by boosting satiety signals that improve sense of fullness/satiety, boosting both GIP and GLP1 hormones. Not to be used by those with Multiple endocrine neoplasia, medullary thyroid cancer and not likely tolerated well for those with recurrent/idiopathic pancreatitis issues. Costly without insurance coverage but very effective in curbing appetite. Once weekly injectable therapy. Nausea/upset stomach/constipation or diarrhea are common side effects. Heart burn can increase in some, as it slows stomach emptying speeds. Should be halted a few weeks prior to elective procedures and may require re-ramping afterwards. [employer] has good patient resources/information.    Contrave (Bupropion/Naltrexone).    Synergistic combination of a mild appetite suppressing anti-depressant (Bupropion) whose effects are increased due to interaction with Naltrexone.  Naltrexone may have some effects on craving and is often used in addiction medicine to help previous opiate addicts be less prone to relapse as it blocks the action of opiates. Should be stopped if any need for opiate pain medication, surgery or planned procedures where you'll be given sedation/anesthesia. If prolonged use, recommend stepping down bupropion over 2-3 weeks to limit any risk of withdrawal issues. Side effects may include dry mouth, increased heart rate, mild elevation in Blood pressure;  dizziness, ringing in the ears, anxiety (typically due to bupropion), nausea, constipation,  "and some get fatigued with naltrexone.  About $210 on [facility] for 120 tabs of \"Contrave\", the brand name without insurance coverage. Generic Bupropion 75mg: $25 for 120 tabs, Naltrexone: $55 for 90 tabs without insurance coverage on [employer]. Cannot be used if pregnant/trying to conceive or breast feeding.  Plenity:   NO LONGER AVAILABLE due to company going bankrupt. [email or web address] has more information.    "

## 2024-03-13 ENCOUNTER — ALLIED HEALTH/NURSE VISIT (OUTPATIENT)
Dept: SURGERY | Facility: CLINIC | Age: 19
End: 2024-03-13
Payer: COMMERCIAL

## 2024-03-13 VITALS — BODY MASS INDEX: 56.39 KG/M2 | WEIGHT: 315 LBS

## 2024-03-13 DIAGNOSIS — E66.01 MORBID OBESITY WITH BMI OF 50.0-59.9, ADULT (H): Primary | ICD-10-CM

## 2024-03-13 DIAGNOSIS — E88.810 METABOLIC SYNDROME X: ICD-10-CM

## 2024-03-13 DIAGNOSIS — Z71.3 NUTRITIONAL COUNSELING: ICD-10-CM

## 2024-03-13 PROCEDURE — 97803 MED NUTRITION INDIV SUBSEQ: CPT

## 2024-03-13 NOTE — PATIENT INSTRUCTIONS
Protein Supplements:    Premiere Protein  Nutrition [facility] Shakes   Ensure Pro Max      Yogurt:  Dannon light and fit Greek  Oikos Triple Zero  Chobani Zero  Two Good

## 2024-03-13 NOTE — PROGRESS NOTES
"  Medical  Weight Loss Follow-Up Diet Evaluation  Assessment:  Nnamdi is presenting today for a follow up weight management nutrition consultation.  This patient has had an initial appointment and was referred by Dr. Palma for MNT as treatment for Morbid obesity which is impacting lower extremity pain.   Weight loss medication:  Zepbound .  Pt's weight is 410 lbs  Initial weight: 408  Weight change: no change, 10 lbs down from last visit with Dr. Palma         No data to display              BMI: There is no height or weight on file to calculate BMI.  Ideal body weight: 75.3 kg (166 lb 0.1 oz)  Adjusted ideal body weight: 119.2 kg (262 lb 12.9 oz)    Estimated RMR ([place]-St Jeor equation):   2730 kcals x 1.2 (sedentary) = 3280 kcals (for weight maintenance)  Recommended Protein Intake: 120-140 grams of protein/day  Patient Active Problem List:  Patient Active Problem List   Diagnosis    Severe obesity (BMI >= 40) (H)    Acanthosis nigricans    Developmental delay    History of seizures    Weakness of both lower extremities    Family history of chromosomal anomaly    Pain in both lower extremities    Scoliosis, unspecified scoliosis type, unspecified spinal region     Diabetes: n/a    Progress on goals from last visit: Patient has start the Zepbound. Is back in school, does not eat at school. Noted he is \"cutting back on soda\", down to 2-3 cans per day vs almost a full 12 pack per day. Is getting outside often when the weather is nice to play football. We dicussed having protein shakes on hand in place of skipping meals. Patients mother reports his siblings will drink the protein shakes but sneaking them or taking them out of patients room. We discussed bringing them to the school nurse for her to hold onto to be able to have them at school.     Eat breakfast daily; grab and go items were discussed. High protein (25-35g protein with carhohydrate) - in progress  Have meals last at least 20 mintues before " "getting second helpings - not met   Try soda alternatives (ICE, Water, olipops, Zevias) - met, did not like      Dietary Recall:  Breakfast: bowl of cereal with maybe some fruit or skipping   Lunch: skips or chicken sandwich with fries OR early dinner   Dinner: fast food, waffles, spaghetti, tacos- what mom makes    Typical snacks: bananas   Eating out: \"couple times/week\"  Beverages:   Soda 2-3 cans per day  Water - 2-3 bottle 16 oz, - continuing to increase water.   Exercise: no routine at this time, likes to play football outside when the weather permits  Nutrition Diagnosis:    Morbid obesity related to excessive energy intake as evidence by large portions, excessive soda intake and BMI of 56.39 kg/m2     Disordered Eating Pattern (NB 1.5) related to obsessive desire, intake of food exceeding RMR as evidenced by binge eating habits, frequent grazing, skipping meals         Intervention:  Food and/or nutrient delivery:   emphasized the importance of eating earlier in the day and avoid skipping meals for optimal metabolic function  Discussed soda alternatives   Nutrition education:   reviewed high protein food options that are quick for busy mornings.  Nutrition counseling: goal setting and continued support.      Monitoring/Evaluation:    Goals:  Have protein shake 1-2 per day in the morning or during school instead of skipping breakfast and lunch  Continue increasing water or zero calorie beverages daily, wean down to 2 sodas per day max.     Patient to follow up in 3 month(s) with bariatrician and 3 month(s) with RD      Time spend with patient: 40 minutes total       Leatha Nance RD, LD       "

## 2024-04-29 ENCOUNTER — TELEPHONE (OUTPATIENT)
Dept: SURGERY | Facility: CLINIC | Age: 19
End: 2024-04-29
Payer: COMMERCIAL

## 2024-04-29 DIAGNOSIS — E66.01 MORBID OBESITY WITH BMI OF 50.0-59.9, ADULT (H): Primary | ICD-10-CM

## 2024-04-29 DIAGNOSIS — E88.810 METABOLIC SYNDROME X: ICD-10-CM

## 2024-04-29 NOTE — TELEPHONE ENCOUNTER
Spoke with Zaina and she said the pharmacy has the 2.5 mg but they have been unable to get the 5 mg dose.  So we will repeat the 2.5 mg until hopefully the 5 mg will come in soon.  Adjusted future dose start dates due to the delay.  Deann Valentin RN

## 2024-04-29 NOTE — TELEPHONE ENCOUNTER
Pt. has been without his prescription for zepbound 2.5 mg for three weeks now this coming Friday. Please advise Pt. 's mother if he is going to start all over again on the medication. Please advise Pt.'s mother, Zaina Chavarria, at [phone]

## 2024-06-27 ENCOUNTER — TELEPHONE (OUTPATIENT)
Dept: SURGERY | Facility: CLINIC | Age: 19
End: 2024-06-27
Payer: COMMERCIAL

## 2024-06-27 NOTE — TELEPHONE ENCOUNTER
VM was left confirming in person appointment on 7/3/2024 with this provider.      Leatha Nance RD

## 2024-06-28 ENCOUNTER — VIRTUAL VISIT (OUTPATIENT)
Dept: SURGERY | Facility: CLINIC | Age: 19
End: 2024-06-28
Payer: COMMERCIAL

## 2024-06-28 VITALS — HEIGHT: 72 IN | BODY MASS INDEX: 56.39 KG/M2

## 2024-06-28 DIAGNOSIS — E88.810 METABOLIC SYNDROME X: Primary | ICD-10-CM

## 2024-06-28 DIAGNOSIS — E66.01 MORBID OBESITY WITH BMI OF 50.0-59.9, ADULT (H): ICD-10-CM

## 2024-06-28 DIAGNOSIS — Z87.898 HISTORY OF SEIZURES: ICD-10-CM

## 2024-06-28 PROCEDURE — 99214 OFFICE O/P EST MOD 30 MIN: CPT | Mod: 95 | Performed by: EMERGENCY MEDICINE

## 2024-06-28 ASSESSMENT — PAIN SCALES - GENERAL: PAINLEVEL: NO PAIN (0)

## 2024-06-28 NOTE — PROGRESS NOTES
Virtual Visit Details    Type of service:  Video Visit   Video Start Time: 10:11 AM  Video End Time:10:36 AM    Originating Location (pt. Location): Home    Distant Location (provider location):  Off-site  Platform used for Video Visit: [facility]    Bariatric Clinic Follow-Up Visit:    Nnamdi Chavarria is a 18 year old  male with Body mass index is 56.39 kg/m .  presenting here today for follow-up on non-surgical efforts for weight loss. Original Intake visit occurred on 7/14/23 with a weight of 408 lbs and BMI of 56.1 with co-morbid fatty liver disease.  Along with diet and behavior changes, he has been using metformin and naltrexone to assist his weight loss goals.  He had trouble with medication compliance early on/daily use and out our 10/27/23 follow up had had limited success in managing appetite well.  As such we attempted to ramp up Wegovy at that visit (shortages currently can impeded access/supply) and he had not been able to start it as of his 11/15/23 dietician visit. 2/28/24 he is gaining weight as a result of poor control of his hunger disorder. We discussed start of Zepbound (tirzepatide) given lack of axis to both Wegovy and Saxenda, previous poor efficacy of phentermine/topamax (qsymia) and liraglutide in the past.  He struggles with remembering meds at his age/cognitive delay issues and single weekly dose is likely to improve compliance/efficacy with medications like Zepbound.  Supply issues caused some lapse in his Zepbound therapy in April of 2024 and as of our 6/28/24 visit he is on 5mg/week and I've written to ramp to 7.5mg then 10mg/week over the next 2 months if tolerated..     See his intake visit notes for details on identified contributors to weight gain in the past. Chart review shows 11/15/23 Dietician calculated RMR of 2730kcal/day and protein intake goal of 120g/day.  Weight:   Wt Readings from Last 5 Encounters:   03/13/24 (!) 186 kg (410 lb) (>99%, Z= 3.97)*   02/28/24 (!) 190.5 kg (420  lb) (>99%, Z= 4.03)*   11/15/23 (!) 181 kg (399 lb) (>99%, Z= 3.86)*   10/27/23 (!) 185.1 kg (408 lb) (>99%, Z= 3.91)*   07/14/23 (!) 185.1 kg (408 lb) (>99%, Z= 3.87)*     * Growth percentiles are based on [facility] (Boys, 2-20 Years) data.    pounds      Comorbidities:  Patient Active Problem List   Diagnosis    Severe obesity (BMI >= 40) (H)    Acanthosis nigricans    Developmental delay    History of seizures    Weakness of both lower extremities    Family history of chromosomal anomaly    Pain in both lower extremities    Scoliosis, unspecified scoliosis type, unspecified spinal region       Current Outpatient Medications:     tirzepatide-Weight Management (ZEPBOUND) 5 MG/0.5ML prefilled pen, Inject 0.5 mLs (5 mg) Subcutaneous every 7 days, Disp: 2 mL, Rfl: 0    [START ON 7/22/2024] tirzepatide-Weight Management (ZEPBOUND) 10 MG/0.5ML prefilled pen, Inject 0.5 mLs (10 mg) Subcutaneous every 7 days, Disp: 6 mL, Rfl: 1    tirzepatide-Weight Management (ZEPBOUND) 2.5 MG/0.5ML prefilled pen, Inject 0.5 mLs (2.5 mg) Subcutaneous every 7 days, Disp: 2 mL, Rfl: 0    tirzepatide-Weight Management (ZEPBOUND) 7.5 MG/0.5ML prefilled pen, Inject 0.5 mLs (7.5 mg) Subcutaneous every 7 days, Disp: 2 mL, Rfl: 0      Interim: Since our last visit, he has been tolerating 5mg/week dose of Zepbound. Fullness. No home scale.     Plan:   1.  Diet: aiming for 2100kcal/day-2300kcal/day with  grams of lean protein daily and 100-120oz of water daily to feel your best during this next phase of weight reduction.    2. Exercise: aim to get out for 20 minutes daily of something fun that moves your body.    3. Medication: Zepbound 5mg/week tolerated. We'll ramp up as supplies allow with 7.5mg/week to start next week and then after 4  weeks increase to 10mg/week and stay at that dose at least 3 months before considering higher doses versus staying at that dose.     4. If not using vitamin D, 4764-3170 international unit(s)/day would be  "recommended given low levels last summer.    5. Goals: track intake for 2 weeks or longer using an moreno like [employer] or [employer] or [employer] or other \"free food tracking moreno\" in your smart phone.     6. Feel free to come in for weight check at your convenience this summer:  [phone] to speak to nurse about a good time or if issues with medication adjustments.      We discussed [facility] Bariat[facility] Basics including:  -eating 3 meals daily  -reviewed metabolic needs for weight loss based on Resting Metabolic Rate  -protein goals supportive of healthy weight loss  -avoiding/limiting calorie containing beverages  -We discussed the importance of restorative sleep and stress management in maintaining a healthy weight.  -We discussed the National Weight Control Registry healthy weight maintenance strategies and ways to optimize metabolism.  -We discussed the importance of physical activity including cardiovascular and strength training in maintaining a healthier weight and explored viable options.      Most recent labs:  Lab Results   Component Value Date    WBC 8.5 07/14/2023    HGB 13.3 07/14/2023    HCT 37.9 (L) 07/14/2023    MCV 84 07/14/2023     07/14/2023     Lab Results   Component Value Date    CHOL 89 06/11/2020     Lab Results   Component Value Date    HDL 37 (L) 06/11/2020     No components found for: \"LDLCALC\"  Lab Results   Component Value Date    TRIG 141 (H) 06/11/2020     No results found for: \"CHOLHDL\"  Lab Results   Component Value Date    ALT 47 07/14/2023    AST 32 07/14/2023    ALKPHOS 58 07/14/2023     No results found for: \"HGBA1C\"  Lab Results   Component Value Date    B12 580 07/14/2023     No components found for: \"VITDT1\"  No results found for: \"SADIA\"  Lab Results   Component Value Date    PTHI 38 07/14/2023     No results found for: \"ZN\"  No results found for: \"VIB1WB\"  Lab Results   Component Value Date    TSH 1.79 07/14/2023     No results found for: \"TEST\"    DIETARY " "HISTORY  Tracking technique: not regularly tracking. Some developmental delay (mom is on call today).  Positive Changes Since Last Visit: feelling more satiated.  Struggling With: mindful eating/activity    Getting Adequate Protein: unsure  Sleep schedule: good.      PHYSICAL ACTIVITY PATTERNS:  Cardiovascular: some walking. Plans to work the [employer] this summer.  Strength Training: no    REVIEW OF SYSTEMS  No major side effects from Zepbound noted. Fullness. \"Only vomited once\" according to Mom.   .  PHYSICAL EXAM:  Vitals: Ht 1.816 m (5' 11.5\")   BMI 56.39 kg/m    Weight:   Wt Readings from Last 3 Encounters:   03/13/24 (!) 186 kg (410 lb) (>99%, Z= 3.97)*   02/28/24 (!) 190.5 kg (420 lb) (>99%, Z= 4.03)*   11/15/23 (!) 181 kg (399 lb) (>99%, Z= 3.86)*     * Growth percentiles are based on CDC (Boys, 2-20 Years) data.         GEN: Pleasant, and soft spoken on phone call. Mom answers if needed.     Interim study results: no.  Vitamin D Deficiency Screening Results:  Lab Results   Component Value Date    VITDT 18 (L) 07/14/2023    VITDT 21 06/11/2020    VITDT 15 (L) 06/13/2019    VITDT 23 06/21/2018     .      30 minutes spent by me on the date of the encounter doing chart review, history and exam, documentation and further activities per the note   Elias Palma MD  [facility]  7:43 AM  6/28/2024  "

## 2024-06-28 NOTE — PATIENT INSTRUCTIONS
"Plan:   1.  Diet: aiming for 2100kcal/day-2300kcal/day with  grams of lean protein daily and 100-120oz of water daily to feel your best during this next phase of weight reduction.    2. Exercise: aim to get out for 20 minutes daily of something fun that moves your body.    3. Medication: Zepbound 5mg/week tolerated. We'll ramp up as supplies allow with 7.5mg/week to start next week and then after 4  weeks increase to 10mg/week and stay at that dose at least 3 months before considering higher doses versus staying at that dose.     4. If not using vitamin D, [id number] international unit(s)/day would be recommended given low levels last summer.    5. Goals: track intake for 2 weeks or longer using an moreno like [employer] or myfitnesspal or mynetdiary or other \"free food tracking moreno\" in your smart phone.     6. Feel free to come in for weight check at your convenience this summer:  [phone] to speak to nurse about a good time or if issues with medication adjustments.  "

## 2024-06-28 NOTE — LETTER
6/28/2024      Nnamdi Chavarria  [address] [place] [address]      Dear Colleague,    Thank you for referring your patient, Nnamdi Chavarria, to the [facility] AND [facility] [place]. Please see a copy of my visit note below.    Virtual Visit Details    Type of service:  Video Visit   Video Start Time: 10:11 AM  Video End Time:10:36 AM    Originating Location (pt. Location): Home    Distant Location (provider location):  Off-site  Platform used for Video Visit: [facility]    Bariatric Clinic Follow-Up Visit:    Nnamdi Chavarria is a 18 year old  male with Body mass index is 56.39 kg/m .  presenting here today for follow-up on non-surgical efforts for weight loss. Original Intake visit occurred on 7/14/23 with a weight of 408 lbs and BMI of 56.1 with co-morbid fatty liver disease.  Along with diet and behavior changes, he has been using metformin and naltrexone to assist his weight loss goals.  He had trouble with medication compliance early on/daily use and out our 10/27/23 follow up had had limited success in managing appetite well.  As such we attempted to ramp up Wegovy at that visit (shortages currently can impeded access/supply) and he had not been able to start it as of his 11/15/23 dietician visit. 2/28/24 he is gaining weight as a result of poor control of his hunger disorder. We discussed start of Zepbound (tirzepatide) given lack of axis to both Wegovy and Saxenda, previous poor efficacy of phentermine/topamax (qsymia) and liraglutide in the past.  He struggles with remembering meds at his age/cognitive delay issues and single weekly dose is likely to improve compliance/efficacy with medications like Zepbound.  Supply issues caused some lapse in his Zepbound therapy in April of 2024 and as of our 6/28/24 visit he is on 5mg/week and I've written to ramp to 7.5mg then 10mg/week over the next 2 months if tolerated..     See his intake visit notes for details on identified  contributors to weight gain in the past. Chart review shows 11/15/23 Dietician calculated RMR of 2730kcal/day and protein intake goal of 120g/day.  Weight:   Wt Readings from Last 5 Encounters:   03/13/24 (!) 186 kg (410 lb) (>99%, Z= 3.97)*   02/28/24 (!) 190.5 kg (420 lb) (>99%, Z= 4.03)*   11/15/23 (!) 181 kg (399 lb) (>99%, Z= 3.86)*   10/27/23 (!) 185.1 kg (408 lb) (>99%, Z= 3.91)*   07/14/23 (!) 185.1 kg (408 lb) (>99%, Z= 3.87)*     * Growth percentiles are based on CDC (Boys, 2-20 Years) data.    pounds      Comorbidities:  Patient Active Problem List   Diagnosis     Severe obesity (BMI >= 40) (H)     Acanthosis nigricans     Developmental delay     History of seizures     Weakness of both lower extremities     Family history of chromosomal anomaly     Pain in both lower extremities     Scoliosis, unspecified scoliosis type, unspecified spinal region       Current Outpatient Medications:      tirzepatide-Weight Management (ZEPBOUND) 5 MG/0.5ML prefilled pen, Inject 0.5 mLs (5 mg) Subcutaneous every 7 days, Disp: 2 mL, Rfl: 0     [START ON 7/22/2024] tirzepatide-Weight Management (ZEPBOUND) 10 MG/0.5ML prefilled pen, Inject 0.5 mLs (10 mg) Subcutaneous every 7 days, Disp: 6 mL, Rfl: 1     tirzepatide-Weight Management (ZEPBOUND) 2.5 MG/0.5ML prefilled pen, Inject 0.5 mLs (2.5 mg) Subcutaneous every 7 days, Disp: 2 mL, Rfl: 0     tirzepatide-Weight Management (ZEPBOUND) 7.5 MG/0.5ML prefilled pen, Inject 0.5 mLs (7.5 mg) Subcutaneous every 7 days, Disp: 2 mL, Rfl: 0      Interim: Since our last visit, he has been tolerating 5mg/week dose of Zepbound. Fullness. No home scale.     Plan:   1.  Diet: aiming for 2100kcal/day-2300kcal/day with  grams of lean protein daily and 100-120oz of water daily to feel your best during this next phase of weight reduction.    2. Exercise: aim to get out for 20 minutes daily of something fun that moves your body.    3. Medication: Zepbound 5mg/week tolerated. We'll ramp up  "as supplies allow with 7.5mg/week to start next week and then after 4  weeks increase to 10mg/week and stay at that dose at least 3 months before considering higher doses versus staying at that dose.     4. If not using vitamin D, [id number] international unit(s)/day would be recommended given low levels last summer.    5. Goals: track intake for 2 weeks or longer using an moreno like [employer] or [employer] or [employer] or other \"free food tracking moreno\" in your smart phone.     6. Feel free to come in for weight check at your convenience this summer:  [phone] to speak to nurse about a good time or if issues with medication adjustments.      We discussed [facility] Bariatric Basics including:  -eating 3 meals daily  -reviewed metabolic needs for weight loss based on Resting Metabolic Rate  -protein goals supportive of healthy weight loss  -avoiding/limiting calorie containing beverages  -We discussed the importance of restorative sleep and stress management in maintaining a healthy weight.  -We discussed the National Weight Control Registry healthy weight maintenance strategies and ways to optimize metabolism.  -We discussed the importance of physical activity including cardiovascular and strength training in maintaining a healthier weight and explored viable options.      Most recent labs:  Lab Results   Component Value Date    WBC 8.5 07/14/2023    HGB 13.3 07/14/2023    HCT 37.9 (L) 07/14/2023    MCV 84 07/14/2023     07/14/2023     Lab Results   Component Value Date    CHOL 89 06/11/2020     Lab Results   Component Value Date    HDL 37 (L) 06/11/2020     No components found for: \"LDLCALC\"  Lab Results   Component Value Date    TRIG 141 (H) 06/11/2020     No results found for: \"CHOLHDL\"  Lab Results   Component Value Date    ALT 47 07/14/2023    AST 32 07/14/2023    ALKPHOS 58 07/14/2023     No results found for: \"HGBA1C\"  Lab Results   Component Value Date    B12 580 07/14/2023     No components found " "for: \"VITDT1\"  No results found for: \"SADIA\"  Lab Results   Component Value Date    PTHI 38 07/14/2023     No results found for: \"ZN\"  No results found for: \"VIB1WB\"  Lab Results   Component Value Date    TSH 1.79 07/14/2023     No results found for: \"TEST\"    DIETARY HISTORY  Tracking technique: not regularly tracking. Some developmental delay (mom is on call today).  Positive Changes Since Last Visit: feelling more satiated.  Struggling With: mindful eating/activity    Getting Adequate Protein: unsure  Sleep schedule: good.      PHYSICAL ACTIVITY PATTERNS:  Cardiovascular: some walking. Plans to work the [employer] this summer.  Strength Training: no    REVIEW OF SYSTEMS  No major side effects from Zepbound noted. Fullness. \"Only vomited once\" according to Mom.   .  PHYSICAL EXAM:  Vitals: Ht 1.816 m (5' 11.5\")   BMI 56.39 kg/m    Weight:   Wt Readings from Last 3 Encounters:   03/13/24 (!) 186 kg (410 lb) (>99%, Z= 3.97)*   02/28/24 (!) 190.5 kg (420 lb) (>99%, Z= 4.03)*   11/15/23 (!) 181 kg (399 lb) (>99%, Z= 3.86)*     * Growth percentiles are based on CDC (Boys, 2-20 Years) data.         GEN: Pleasant, and soft spoken on phone call. Mom answers if needed.     Interim study results: no.  Vitamin D Deficiency Screening Results:  Lab Results   Component Value Date    VITDT 18 (L) 07/14/2023    VITDT 21 06/11/2020    VITDT 15 (L) 06/13/2019    VITDT 23 06/21/2018     .      30 minutes spent by me on the date of the encounter doing chart review, history and exam, documentation and further activities per the note   Elias Palma MD  [facility]  7:43 AM  6/28/2024      Again, thank you for allowing me to participate in the care of your patient.        Sincerely,        Elias Palma MD  "

## 2024-06-28 NOTE — NURSING NOTE
Is the patient currently in the state of [place]? YES    Visit mode:VIDEO    If the visit is dropped, the patient can be reconnected by: VIDEO VISIT: Text to cell phone:   Telephone Information:   Mobile [phone]       Will anyone else be joining the visit? NO  (If patient encounters technical issues they should call [phone])    How would you like to obtain your AVS? MyChart    Are changes needed to the allergy or medication list? No, Pt stated no changes to allergies, and Pt stated no med changes    Are refills needed on medications prescribed by this physician? NO    Reason for visit: RECHECK (WENDY)    Ingris BROOKS

## 2024-07-03 ENCOUNTER — VIRTUAL VISIT (OUTPATIENT)
Dept: SURGERY | Facility: CLINIC | Age: 19
End: 2024-07-03
Payer: COMMERCIAL

## 2024-07-03 DIAGNOSIS — Z71.3 NUTRITIONAL COUNSELING: ICD-10-CM

## 2024-07-03 DIAGNOSIS — E88.810 METABOLIC SYNDROME X: ICD-10-CM

## 2024-07-03 DIAGNOSIS — E66.01 MORBID OBESITY WITH BMI OF 50.0-59.9, ADULT (H): Primary | ICD-10-CM

## 2024-07-03 PROCEDURE — 97803 MED NUTRITION INDIV SUBSEQ: CPT | Mod: 95

## 2024-07-03 NOTE — LETTER
7/3/2024      Nnamdi Chavarria  [address] [place] [address]      Dear Colleague,    Thank you for referring your patient, Nnamdi Chavarria, to the [facility]ERY [facility]ATRICS [facility] [place]. Please see a copy of my visit note below.    Nnamdi Chavarria is a 18 year old who is being evaluated via a billable video visit.        How would you like to obtain your AVS? MyChart  If the video visit is dropped, the invitation should be resent by: Text to cell phone: [phone]  Will anyone else be joining your video visit? Yes- Mother is sitting in via video         Medical  Weight Loss Follow-Up Diet Evaluation  Assessment:  Nnamdi is presenting today for a follow up weight management nutrition consultation.  This patient has had an initial appointment and was referred by Dr. Palma for MNT as treatment for Morbid obesity.  Weight loss medication:  Zepbound .   Pt's weight is 410 lbs- has not weighed himself since last visit  Initial weight: 408  Weight change: n/a        6/28/2024     9:54 AM   Changes and Difficulties   I have made the following changes to my diet since my last visit: None   With regards to my diet, I am still struggling with: None   I have made the following changes to my activity/exercise since my last visit: None   With regards to my activity/exercise, I am still struggling with: None     BMI: There is no height or weight on file to calculate BMI.  Patient weight not recorded    Estimated RMR ([place]-St Jeor equation):   2730 kcals x 1.2 (sedentary) = 3280 kcals (for weight maintenance)  Recommended Protein Intake: 120-140 grams of protein/day  Patient Active Problem List:  Patient Active Problem List   Diagnosis     Severe obesity (BMI >= 40) (H)     Acanthosis nigricans     Developmental delay     History of seizures     Weakness of both lower extremities     Family history of chromosomal anomaly     Pain in both lower extremities     Scoliosis, unspecified scoliosis  "type, unspecified spinal region       Progress on goals from last visit: Patient reports he is having more consistency with lunch. Trailed having a protein shake for breakfast and enjoyed. Portion sizes are being well monitored per patient report.   - significantly weaned down from soda intake   - continues to increase water intake  - portion sizes appear smaller per patient and mother report.  - enjoys having a protein shake for breakfast vs skipping        Have protein shake 1-2 per day in the morning or during school instead of skipping breakfast and lunch  Continue increasing water or zero calorie beverages daily, wean down to 2 sodas per day max. - met     Dietary Recall:  Breakfast: bowl of cereal with maybe some fruit or skipping   Lunch: Chicken sandwich made at home with water   Dinner: fast food, waffles, spaghetti, tacos- what mom makes    Typical snacks: bananas or grapes   Eating out: \"couple times/week\"  Beverages:   Soda 1 can per day   Water - 2-3 bottle 16 oz, - continuing to increase water.   Exercise: no routine at this time, likes to play football outside when the weather permits     Nutrition Diagnosis:    Morbid obesity related to excessive energy intake as evidence by large portions, excessive soda intake and BMI of 56.39 kg/m2      Disordered Eating Pattern (NB 1.5) related to obsessive desire, intake of food exceeding RMR as evidenced by binge eating habits, frequent grazing, skipping meals         Intervention:  Food and/or nutrient delivery:   emphasized the importance of eating earlier in the day and avoid skipping meals for optimal metabolic function  Enjoys continued success with weaning from soda intake daily  Nutrition education:   N/a  Nutrition counseling: goal setting and continued support.    Monitoring/Evaluation:    Goals:  Continue weaning from daily soda intake  Increase movement/ steps in the day during nice weather  Continue with staying consistent with meals during the day " and utilizing a protein shake when needed.  Start low dose of Vit D supplement ([id number] international unit(s))    Patient to follow up in 4 month(s) with bariatrician and 5 month(s) with RD      Video-Visit Details    Type of service:  Video Visit    Video Start Time (time video started): 11:00 AM    Video End Time (time video stopped): 11:12 AM    Originating Location (pt. Location): Home      Distant Location (provider location):  Off-site    Mode of Communication:  Video Conference via [facility]    Physician has received verbal consent for a Video Visit from the patient? Yes      Leatha Modi RD           Again, thank you for allowing me to participate in the care of your patient.        Sincerely,        Leatha Modi RD

## 2024-07-03 NOTE — PROGRESS NOTES
Nnamdi Chavarria is a 18 year old who is being evaluated via a billable video visit.        How would you like to obtain your AVS? MyChart  If the video visit is dropped, the invitation should be resent by: Text to cell phone: [phone]  Will anyone else be joining your video visit? Yes- Mother is sitting in via video         Medical  Weight Loss Follow-Up Diet Evaluation  Assessment:  Nnamdi is presenting today for a follow up weight management nutrition consultation.  This patient has had an initial appointment and was referred by Dr. Plama for MNT as treatment for Morbid obesity.  Weight loss medication:  Zepbound .   Pt's weight is 410 lbs- has not weighed himself since last visit  Initial weight: 408  Weight change: n/a        6/28/2024     9:54 AM   Changes and Difficulties   I have made the following changes to my diet since my last visit: None   With regards to my diet, I am still struggling with: None   I have made the following changes to my activity/exercise since my last visit: None   With regards to my activity/exercise, I am still struggling with: None     BMI: There is no height or weight on file to calculate BMI.  Patient weight not recorded    Estimated RMR ([place]-St Jeor equation):   2730 kcals x 1.2 (sedentary) = 3280 kcals (for weight maintenance)  Recommended Protein Intake: 120-140 grams of protein/day  Patient Active Problem List:  Patient Active Problem List   Diagnosis    Severe obesity (BMI >= 40) (H)    Acanthosis nigricans    Developmental delay    History of seizures    Weakness of both lower extremities    Family history of chromosomal anomaly    Pain in both lower extremities    Scoliosis, unspecified scoliosis type, unspecified spinal region       Progress on goals from last visit: Patient reports he is having more consistency with lunch. Trailed having a protein shake for breakfast and enjoyed. Portion sizes are being well monitored per patient report.   - significantly weaned  "down from soda intake   - continues to increase water intake  - portion sizes appear smaller per patient and mother report.  - enjoys having a protein shake for breakfast vs skipping        Have protein shake 1-2 per day in the morning or during school instead of skipping breakfast and lunch  Continue increasing water or zero calorie beverages daily, wean down to 2 sodas per day max. - met     Dietary Recall:  Breakfast: bowl of cereal with maybe some fruit or skipping   Lunch: Chicken sandwich made at home with water   Dinner: fast food, waffles, spaghetti, tacos- what mom makes    Typical snacks: bananas or grapes   Eating out: \"couple times/week\"  Beverages:   Soda 1 can per day   Water - 2-3 bottle 16 oz, - continuing to increase water.   Exercise: no routine at this time, likes to play football outside when the weather permits     Nutrition Diagnosis:    Morbid obesity related to excessive energy intake as evidence by large portions, excessive soda intake and BMI of 56.39 kg/m2      Disordered Eating Pattern (NB 1.5) related to obsessive desire, intake of food exceeding RMR as evidenced by binge eating habits, frequent grazing, skipping meals         Intervention:  Food and/or nutrient delivery:   emphasized the importance of eating earlier in the day and avoid skipping meals for optimal metabolic function  Enjoys continued success with weaning from soda intake daily  Nutrition education:   N/a  Nutrition counseling: goal setting and continued support.    Monitoring/Evaluation:    Goals:  Continue weaning from daily soda intake  Increase movement/ steps in the day during nice weather  Continue with staying consistent with meals during the day and utilizing a protein shake when needed.  Start low dose of Vit D supplement ([id number] international unit(s))    Patient to follow up in 4 month(s) with bariatrician and 5 month(s) with RD      Video-Visit Details    Type of service:  Video Visit    Video Start Time " (time video started): 11:00 AM    Video End Time (time video stopped): 11:12 AM    Originating Location (pt. Location): Home      Distant Location (provider location):  Off-site    Mode of Communication:  Video Conference via [facility]    Physician has received verbal consent for a Video Visit from the patient? Yes      Leatha Modi RD

## 2024-07-03 NOTE — NURSING NOTE
Is the patient currently in the state of [place]? YES    Visit mode:VIDEO    If the visit is dropped, the patient can be reconnected by: VIDEO VISIT: Text to cell phone:   Telephone Information:   Mobile [phone]       Will anyone else be joining the visit? NO  (If patient encounters technical issues they should call [phone])    How would you like to obtain your AVS? MyChart    Are changes needed to the allergy or medication list? No    Are refills needed on medications prescribed by this physician? NO    Reason for visit: RECHSALO BROOKS

## 2024-08-28 ENCOUNTER — MYC MEDICAL ADVICE (OUTPATIENT)
Dept: SURGERY | Facility: CLINIC | Age: 19
End: 2024-08-28
Payer: COMMERCIAL

## 2024-08-28 DIAGNOSIS — E66.01 MORBID OBESITY WITH BMI OF 50.0-59.9, ADULT (H): ICD-10-CM

## 2024-09-04 NOTE — TELEPHONE ENCOUNTER
Patient doing well on the 5 mg dose and is ready to increase to the 7.5 mg.  Will sent that one in again.  Deann Valentin RN

## 2024-09-21 ENCOUNTER — HEALTH MAINTENANCE LETTER (OUTPATIENT)
Age: 19
End: 2024-09-21

## 2024-11-04 ENCOUNTER — OFFICE VISIT (OUTPATIENT)
Dept: SURGERY | Facility: CLINIC | Age: 19
End: 2024-11-04
Payer: COMMERCIAL

## 2024-11-04 VITALS
SYSTOLIC BLOOD PRESSURE: 118 MMHG | WEIGHT: 315 LBS | BODY MASS INDEX: 42.66 KG/M2 | DIASTOLIC BLOOD PRESSURE: 68 MMHG | HEIGHT: 72 IN

## 2024-11-04 DIAGNOSIS — R62.50 DEVELOPMENTAL DELAY: ICD-10-CM

## 2024-11-04 DIAGNOSIS — E88.810 METABOLIC SYNDROME X: ICD-10-CM

## 2024-11-04 DIAGNOSIS — R21 RASH OF NECK: ICD-10-CM

## 2024-11-04 DIAGNOSIS — E66.01 CLASS 3 SEVERE OBESITY DUE TO EXCESS CALORIES WITH SERIOUS COMORBIDITY AND BODY MASS INDEX (BMI) OF 50.0 TO 59.9 IN ADULT (H): Primary | ICD-10-CM

## 2024-11-04 DIAGNOSIS — E66.813 CLASS 3 SEVERE OBESITY DUE TO EXCESS CALORIES WITH SERIOUS COMORBIDITY AND BODY MASS INDEX (BMI) OF 50.0 TO 59.9 IN ADULT (H): Primary | ICD-10-CM

## 2024-11-04 PROCEDURE — 99214 OFFICE O/P EST MOD 30 MIN: CPT | Performed by: EMERGENCY MEDICINE

## 2024-11-04 RX ORDER — ERGOCALCIFEROL 1.25 MG/1
50000 CAPSULE, LIQUID FILLED ORAL WEEKLY
COMMUNITY
Start: 2024-10-24

## 2024-11-04 NOTE — LETTER
11/4/2024      Nnamdi Chavarria  [address] [place] [address]      Dear Colleague,    Thank you for referring your patient, Nnamdi Chavarria, to the [facility] [place]. Please see a copy of my visit note below.    Bariatric Clinic Follow-Up Visit:    Nnamdi Chavarria is a 19 year old  male with Body mass index is 52.47 kg/m .  presenting here today for follow-up on non-surgical efforts for weight loss.  Original Intake visit occurred on 7/14/23 with a weight of 408 lbs and BMI of 56.1 with co-morbid fatty liver disease.  Along with diet and behavior changes, he has been using metformin and naltrexone to assist his weight loss goals.  He had trouble with medication compliance early on/daily use and out our 10/27/23 follow up had had limited success in managing appetite well.  As such we attempted to ramp up Wegovy at that visit (shortages currently can impeded access/supply) and he had not been able to start it as of his 11/15/23 dietician visit. 2/28/24 he is gaining weight as a result of poor control of his hunger disorder. We discussed start of Zepbound (tirzepatide) given lack of axis to both Wegovy and Saxenda, previous poor efficacy of phentermine/topamax (qsymia) and liraglutide in the past.  He struggles with remembering meds at his age/cognitive delay issues and single weekly dose was thought to improve compliance/efficacy with his appetite suppressant.  Supply issues caused some lapse in his Zepbound therapy in April of 2024 and as of our 6/28/24 visit he was on 5mg/week and we planned to ramp to 10mg/week. As of our 11/4/24 visit he is  up to 10mg/week and down about 39 lbs from peak weight in February of 2024 and 9.3% total body weight reduction.     Dietician visits have continued, most recently 7/3/24 with RMR of 2730kcal/day and protein goal of 120-140g/day.       Weight:   Wt Readings from Last 5 Encounters:   11/04/24 (!) 173 kg (381 lb 8 oz)   03/13/24  (!) 186 kg (410 lb) (>99%, Z= 3.97)*   02/28/24 (!) 190.5 kg (420 lb) (>99%, Z= 4.03)*   11/15/23 (!) 181 kg (399 lb) (>99%, Z= 3.86)*   10/27/23 (!) 185.1 kg (408 lb) (>99%, Z= 3.91)*     * Growth percentiles are based on [facility] (Boys, 2-20 Years) data.    pounds      Comorbidities:  Patient Active Problem List   Diagnosis     Severe obesity (BMI >= 40) (H)     Acanthosis nigricans     Developmental delay     History of seizures     Weakness of both lower extremities     Family history of chromosomal anomaly     Pain in both lower extremities     Scoliosis, unspecified scoliosis type, unspecified spinal region       Current Outpatient Medications:      tirzepatide-Weight Management (ZEPBOUND) 10 MG/0.5ML prefilled pen, Inject 0.5 mLs (10 mg) Subcutaneous every 7 days for 180 days, Disp: 6 mL, Rfl: 1     vitamin D2 (ERGOCALCIFEROL) 43109 units (1250 mcg) capsule, Take 50,000 Units by mouth once a week., Disp: , Rfl:      tirzepatide-Weight Management (ZEPBOUND) 7.5 MG/0.5ML prefilled pen, Inject 0.5 mLs (7.5 mg) subcutaneously every 7 days. (Patient not taking: Reported on 11/4/2024), Disp: 2 mL, Rfl: 0      Interim: Since our last visit, he has had excellent weight loss, down over 9% total body weight. Zepbound well tolerated/helpful at the 10mg/week dose and we'll plan to stay there this winter if continues to be efficacious/tolerated. We'll consider further upward titration if stalling out or lower efficacy.  Reviewed toolbox approach and will shoot for about 2200kca/day 120g/day protein and 120 oz of water daily.    Plan:   1.  Diet: aiming for 2200kcal/day with 120 grams of lean protein daily and 120 oz of water daily. Start meals with protein first approach to meals, consider a supplemental shake or smoothie if needed to get into the range needed. Bare minimum, keep intake above 1800-1900kcal/day to prevent too much muscle mass losses.    2. Exercise: aiming to get to the [employer] 2x weekly if possible.     3.  "Medication: Zepbound 10mg/week going well, we'll stay at this dose for now.     4. Referral to Derm for neck rash above your acanthosis. Unclear what treatment is necessary for this.     5. Goals: down about 30 lbs from our introductory visit and nearly 40 lbs from peak weight earlier this year. Well done. Approaching a 10% total body weight reduction thus far. Aiming to continue seeing 1-2lbs/week of weight loss and get weight under 350 lbs this winter.       We discussed [facility]ariat[facility] Basics including:  -eating 3 meals daily  -reviewed metabolic needs for weight loss based on Resting Metabolic Rate  -protein goals supportive of healthy weight loss  -avoiding/limiting calorie containing beverages  -We discussed the importance of restorative sleep and stress management in maintaining a healthy weight.  -We discussed the National Weight Control Registry healthy weight maintenance strategies and ways to optimize metabolism.  -We discussed the importance of physical activity including cardiovascular and strength training in maintaining a healthier weight and explored viable options.      Most recent labs:  Lab Results   Component Value Date    WBC 8.5 07/14/2023    HGB 13.3 07/14/2023    HCT 37.9 (L) 07/14/2023    MCV 84 07/14/2023     07/14/2023     Lab Results   Component Value Date    CHOL 89 06/11/2020     Lab Results   Component Value Date    HDL 37 (L) 06/11/2020     No components found for: \"LDLCALC\"  Lab Results   Component Value Date    TRIG 141 (H) 06/11/2020     No results found for: \"CHOLHDL\"  Lab Results   Component Value Date    ALT 47 07/14/2023    AST 32 07/14/2023    ALKPHOS 58 07/14/2023     No results found for: \"HGBA1C\"  Lab Results   Component Value Date    B12 580 07/14/2023     No components found for: \"VITDT1\"  No results found for: \"SADIA\"  Lab Results   Component Value Date    PTHI 38 07/14/2023     No results found for: \"ZN\"  No results found for: \"VIB1WB\"  Lab Results   Component " "Value Date    TSH 1.79 07/14/2023     No results found for: \"TEST\"    DIETARY HISTORY  Less cravings/less late night snacking. More satiated. Hydrating OK. More protein awareness but couldn't name them so reviewed handout again.      PHYSICAL ACTIVITY PATTERNS:  Cardiovascular: [employer] access but not going recently. [employer]  Strength Training: limited recently. Getting back to [facility].    REVIEW OF SYSTEMS  No upset stomach or vomiting. Minimal constipation complaints. No rash..  PHYSICAL EXAM:  Vitals: /68   Ht 1.816 m (5' 11.5\")   Wt (!) 173 kg (381 lb 8 oz)   BMI 52.47 kg/m    Weight:   Wt Readings from Last 3 Encounters:   11/04/24 (!) 173 kg (381 lb 8 oz)   03/13/24 (!) 186 kg (410 lb) (>99%, Z= 3.97)*   02/28/24 (!) 190.5 kg (420 lb) (>99%, Z= 4.03)*     * Growth percentiles are based on CDC (Boys, 2-20 Years) data.         GEN: Pleasant, well groomed, in no acute distress  HEENT:  acanthosis nigricans to neck creases posteriorlaterally with vulgaris type rash with hyperkeratosis and some exoriation at nape of neck/hairline. No overt cellulitis but will refer to derm for evaluation ?HS .  NECK: No swelling.  HEART: RRR.  LUNGS: No respiratory difficulty noted. No cough. .  ABDOMEN: nontender/obese.  EXTREMITIES: No tremor. Ambulation is independent..  NEURO: Alert and Oriented X3, fluent speech. .  SKIN: No visible rashes. .    Interim study results:Last Comprehensive Metabolic Panel:  Sodium   Date Value Ref Range Status   07/14/2023 143 136 - 145 mmol/L Final   06/11/2020 141 133 - 143 mmol/L Final     Potassium   Date Value Ref Range Status   07/14/2023 4.1 3.4 - 5.3 mmol/L Final   06/11/2020 3.7 3.4 - 5.3 mmol/L Final     Chloride   Date Value Ref Range Status   07/14/2023 107 98 - 107 mmol/L Final   06/11/2020 110 98 - 110 mmol/L Final     Carbon Dioxide   Date Value Ref Range Status   06/11/2020 26 20 - 32 mmol/L Final     Carbon Dioxide (CO2)   Date Value Ref Range Status   07/14/2023 24 22 - 29 " mmol/L Final     Anion Gap   Date Value Ref Range Status   07/14/2023 12 7 - 15 mmol/L Final   06/11/2020 5 3 - 14 mmol/L Final     Glucose   Date Value Ref Range Status   07/14/2023 106 (H) 70 - 99 mg/dL Final   06/11/2020 74 70 - 99 mg/dL Final     Urea Nitrogen   Date Value Ref Range Status   07/14/2023 8.7 6.0 - 20.0 mg/dL Final   06/11/2020 10 7 - 21 mg/dL Final     Creatinine   Date Value Ref Range Status   07/14/2023 0.78 0.67 - 1.17 mg/dL Final   06/11/2020 0.80 0.50 - 1.00 mg/dL Final     GFR Estimate   Date Value Ref Range Status   07/14/2023 >90 >60 mL/min/1.73m2 Final   06/11/2020 GFR not calculated, patient <18 years old. >60 mL/min/[1.73_m2] Final     Comment:     Non  GFR Calc  Starting 12/18/2018, serum creatinine based estimated GFR (eGFR) will be   calculated using the Chronic Kidney Disease Epidemiology Collaboration   (CKD-EPI) equation.       Calcium   Date Value Ref Range Status   07/14/2023 9.4 8.6 - 10.0 mg/dL Final   06/11/2020 9.0 8.5 - 10.1 mg/dL Final     Bilirubin Total   Date Value Ref Range Status   07/14/2023 0.5 <=1.2 mg/dL Final     Alkaline Phosphatase   Date Value Ref Range Status   07/14/2023 58 40 - 129 U/L Final     ALT   Date Value Ref Range Status   07/14/2023 47 0 - 50 U/L Final     Comment:     Reference intervals for this test were updated on 6/12/2023 to more accurately reflect our healthy population. There may be differences in the flagging of prior results with similar values performed with this method. Interpretation of those prior results can be made in the context of the updated reference intervals.     06/11/2020 60 (H) 0 - 50 U/L Final     AST   Date Value Ref Range Status   07/14/2023 32 0 - 35 U/L Final     Comment:     Reference intervals for this test were updated on 6/12/2023 to more accurately reflect our healthy population. There may be differences in the flagging of prior results with similar values performed with this method. Interpretation  of those prior results can be made in the context of the updated reference intervals.   06/11/2020 30 0 - 35 U/L Final               Lab Results   Component Value Date    A1C 5.3 07/14/2023    A1C 5.2 06/11/2020    A1C 4.8 06/13/2019    A1C 5.4 06/21/2018     Lab Results   Component Value Date    WBC 8.5 07/14/2023     Lab Results   Component Value Date    RBC 4.53 07/14/2023     Lab Results   Component Value Date    HGB 13.3 07/14/2023     Lab Results   Component Value Date    HCT 37.9 07/14/2023     Lab Results   Component Value Date    MCV 84 07/14/2023     Lab Results   Component Value Date    MCH 29.4 07/14/2023     Lab Results   Component Value Date    MCHC 35.1 07/14/2023     Lab Results   Component Value Date    RDW 12.7 07/14/2023     Lab Results   Component Value Date     07/14/2023     .  Recent Labs   Lab Test 06/11/20  1040 06/21/18  1501   CHOL 89 62   HDL 37* 34*   LDL 24 17   TRIG 141* 57           30 minutes spent by me on the date of the encounter doing chart review, history and exam, documentation and further activities per the note   Elias Palma MD  [facility]  7:58 AM  11/4/2024      Again, thank you for allowing me to participate in the care of your patient.        Sincerely,        Elias Palma MD

## 2024-11-04 NOTE — PATIENT INSTRUCTIONS
Wt Readings from Last 5 Encounters:   11/04/24 (!) 173 kg (381 lb 8 oz)   03/13/24 (!) 186 kg (410 lb) (>99%, Z= 3.97)*   02/28/24 (!) 190.5 kg (420 lb) (>99%, Z= 4.03)*   11/15/23 (!) 181 kg (399 lb) (>99%, Z= 3.86)*   10/27/23 (!) 185.1 kg (408 lb) (>99%, Z= 3.91)*     * Growth percentiles are based on [facility] (Boys, 2-20 Years) data.     Plan:   1.  Diet: aiming for 2200kcal/day with 120 grams of lean protein daily and 120 oz of water daily. Start meals with protein first approach to meals, consider a supplemental shake or smoothie if needed to get into the range needed. Bare minimum, keep intake above 1800-1900kcal/day to prevent too much muscle mass losses.    2. Exercise: aiming to get to the [employer] 2x weekly if possible.     3. Medication: Zepbound 10mg/week going well, we'll stay at this dose for now.     4. Referral to Derm for neck rash above your acanthosis. Unclear what treatment is necessary for this.     5. Goals: down about 30 lbs from our introductory visit and nearly 40 lbs from peak weight earlier this year. Well done. Approaching a 10% total body weight reduction thus far. Aiming to continue seeing 1-2lbs/week of weight loss and get weight under 350 lbs this winter.       LEAN PROTEIN SOURCES  Getting 20-30 grams of protein, 3 meals daily, is appropriate for most people, some need more but more than about 40 grams per meal is not useful.  General rule is drinking one ounce of water per gram of protein eaten over the course of the day:  70 grams of protein each day, drink 70 oz of water.  Protein Source Portion Calories Grams of Protein                           Nonfat, plain Greek yogurt    (10 grams sugar or less) 3/4 cup (6 oz)  12-17   Light Yogurt (10 grams sugar or less) 3/4 cup (6 oz)  6-8   Protein Shake 1 shake 110-180 15-30   Skim/1% Milk or lactose-free milk 1 cup ( 8 oz)  8   Plain or light, flavored soymilk 1 cup  7-8   Plain or light, hemp milk 1 cup 110 6   Fat  Free or 1% Cottage Cheese 1/2 cup 90 15   Part skim ricotta cheese 1/2 cup 100 14   Part skim or reduced fat cheese slices 1 ounce 65-80 8     Mozzarella String Cheese 1 80 8   Canned tuna, chicken, crab or salmon  (canned in water)  1/2 cup 100 15-20   White fish (broiled, grilled, baked) 3 ounces 100 21   [place]/Tuna (broiled, grilled, baked) 3 ounces 150-180 21   Shrimp, Scallops, Lobster, Crab 3 ounces 100 21   Pork loin, Pork Tenderloin 3 ounces 150 21   Boneless, skinless chicken /turkey breast                          (broiled, grilled, baked) 3 ounces 120 21   [place], [place], [place], and Venison 3 ounces 120 21   Lean cuts of red meat and pork (sirloin,   round, tenderloin, flank, ground 93%-96%) 3 ounces 170 21   Lean or Extra Lean Ground Turkey 1/2 cup 150 20   90-95% Lean [place] Burger 1 lani 140-180 21   Low-fat casserole with lean meat 3/4 cup 200 17   Luncheon Meats                                                        (turkey, lean ham, roast beef, chicken) 3 ounces 100 21   Egg (boiled, poached, scrambled) 1 Egg 60 7   Egg Substitute 1/2 cup 70 10   Nuts (limit to 1 serving per day)  3 Tbsp. 150 7   Nut [place] (peanut, almond)  Limit to 1 serving or less daily 1 Tbsp. 90 4   Soy Burger (varies) 1  15   Garbanzo, Black, [place] Beans 1/2 cup 110 7   Refried Beans 1/2 cup 100 7   Kidney and Lima beans 1/2 cup 110 7   Tempeh 3 oz 175 18   Vegan crumbles 1/2 cup 100 14   Tofu 1/2 cup 110 14   Chili (beans and extra lean beef or turkey) 1 cup 200 23   Lentil Stew/Soup 1 cup 150 12   Black Bean Soup 1 cup 175 12           Basic Smoothie:    Start with a good, unsweetened protein powder (BiPro, TerasWhey, or your favorite) 2 scoops is usually at least 20 grams of protein. (goal of 20-30 grams), READ THE LABEL.    izzle (1 tablespoon) of olive oil (120 partha) and/or 1/2 an avocado (remove pit:175 partha) for good fat/satiation.  Adjust these to fit into your particular calorie needs.    Half a banana (70  partha )    Handful of frozen blueberries/raspberries/peach slices (or all) (30-40 partha)    Small,  thumbnail sized piece of nellie root (cut off skin) (10-20 partha)    3 leafs or more of Kale (strip away from stem and use just the leaves).  May use fresh or frozen.  Can also add spinach/parsley to mix it up.  (25 partha)    One stalk of celery torn in 2-3 pieces. Add more if desired. (5 calories)    1/3-1/2 of a cucumber cut into small sections. (15calories)    1 inch section of Red/Green/Yellow Bell pepper without the seeds. (10 calories)    Add 6 oz of water to allow for a drinkable consistency. More if needed.    Blend until smooth.  Drink right away or bring to work (keeping refrigerated) for an on the go lunch.    Rinse  right away to avoid stuck on mess.      Play around with different combinations or your personal favorites.  Try to get a bit of good fat, protein powder, fruits and veggies all in one smoothie.  Watch portion sizes   The above recipe is roughly 450-500 calories depending on how much actually goes into smoothie. This would be a good meal replacement.  If trying to lose weight with a smoothie type diet, I recommend a complete multi-vitamin to make sure all nutrients are supported. Having 2 smoothies a day and one balance evening meal such as fish/stir fried vegetables/mushrooms and a glass of water will generally keep daily calorie content between 2288-5776 calories a day.  Twilight women or those with a less than 1300 calorie daily target may need to pay closer attention to portion sizes to achieve their target daily caloric intake for sustainable/durable weight loss.      On-the-Go Breakfast Ideas  As of 2015, the latest research shows what a huge impact eating breakfast has on losing weight and feeling your best. People lose more weight when they make breakfast their biggest meal of the day compared to Dinner, but even if you cannot go to that degree, getting a breakfast that has at least 20  grams of protein and even a moderate amount of fat is ideal for maintaining good energy through the day and limits overeating in the evening hours.  The following are some quick and easy suggestions for at least getting something of substance into your body in the morning.  Enjoy!    Eating breakfast within 90 minutes of waking up is an important part of taking care of your body on a restricted calorie diet plan.  After sleeping for hours, your body is in need of fuel.  An ideal breakfast is a combination of protein, whole-grain carbohydrates, or fruit.  Here s why:    -Protein digests very slowly in the body, helping you feel more satisfied.  -Whole grains provide dietary fiber, which also digests slowly and helps keep your gut clean.  -Fruit is a great source of vitamins, minerals, and fiber.     Each one of these breakfast combinations has between 200-300 calories and 15-20 grams of protein.  Feel free to mix and match!    Bone Broth (chicken bone broth or beef bone broth) is a great way to boost protein content. 8oz of bone broth will typically have 9-12grams of protein for 40kcal of energy.    Protein: Choose  -1/2 cup low-fat cottage cheese  -2 hard boiled eggs , or one cooked in olive oil (low/slow heat).  -1 low fat string cheese stick  -1 Camrivoxon natural peanut butter  -[employer] vegetarian sausage lani (found in freezer section)  -1 slice lowfat cheese  -6 oz 2% or lowfat Greek yogurt, such as Fage or Oikos.    PLUS    Whole Grains:  Choose   -1 whole wheat English muffin  -1 whole wheat dunia, half  -1/2 Fiber One frozen muffin, thawed  -1/2 Fiber One toaster pastry  -1 whole wheat bagel thin  -1/2 cup Kashi cereal  -1 Kashi waffle (or other whole grain high-fiber waffle)  Aim for whole grain/sprouted breads with at least 3g of fiber/slice if having bread. [place] is one such brand.    OR    Fruit: Choose  -1/3 cup blueberries  -1/2 banana (or a plantain- similar to a banana, yet  smaller)  -1/2 cup cantaloupe cubes  -1 small apple  -1 small orange  -1/2 cup strawberries  -handful raspberries/blackberries (each berry is about 1 calorie).    *Adapted from [employer], Fall 20    Ten Breakfasts Under 250 calories    Ideally, getting between 350-600 calories  (depending on starting height and weight)for breakfast is ideal for avoiding hunger later in the day, adjust/add to the following accordingly:    One- 250 calories, 8.5 g protein  1 slice whole-grain toast   1 Tbsp peanut butter    banana    Two- 250 calories, 8 g protein    cup nonfat/lowfat yogurt  1/3rd cup diced no-sugar peaches  1/3rd cup cereal (like Special K, Cheerios, or bran flakes)    Three- 250 calories, 25 g protein  1 egg scrambled with 1 oz skim milk    cup shredded cheddar    whole grain English muffin  1 oz [place] iglesias  1 tsp margarine spread    Four- 225 calories, 25 g protein  1/2 cup Kashi Go-Lean cereal    cup skim milk mixed with 1 scoop Bariatric Advantage protein powder    cup no-sugar diced pears    Five- 250 calories, 20 g protein    cup oatmeal prepared with skim milk, 1 scoop protein powder, and sugar-free maple syrup    Six- 200 calories, 5 g protein  1 whole grain waffle, toasted  1 tablespoon creamy peanut or almond butter    Seven-  250 calories, 19 g protein  Breakfast sandwich: 1 slice whole grain toast, cut in half.  Add 1 scrambled egg and one slice cheddar  cheese.    Eight-  250 calories, 15 g protein  2 eggs scrambled with 1/3 cup frozen spinach (heat before adding to eggs) and 2 tablespoons low fat cream cheese.    Nine-  150 calories, 15 g protein  2/3rd cup cottage cheese    cup cantaloupe    Ten- 200 calories, 20 g protein  Fruit smoothie made with 4 oz. nonfat Greek yogurt,   cup berries, 1 scoop protein powder, and 4 oz skim milk.    Ten Lunches Under 250 Calories    Aim for lunch to be around 300-400 calories a day when trying to lose weight and get that protein in!    One- 200 calories,  11 g protein  1/3 cup tuna salad made with light vazquez on 1 slice whole grain bread  1 small peeled apple    Two- 250 calories, 16 g protein  1/3 cup lowfat cottage cheese    cup cooked green beans    small fruit cocktail (in natural juice)    Three- 200 calories, 11 g protein    grilled cheese sandwich on whole grain bread with lowfat cheese  2/3rd cup of tomato soup    Four- 250 calories, 22 g protein  Deli wrap: 1 oz sliced turkey, 1 oz sliced ham, 1 oz sliced chicken rolled up with 1 slice low-fat cheese  1 small orange    Five- 250 calories, 28 g protein  2/3rd cup chili with 1 oz shredded cheese  4 saltine crackers    Six- 250 calories, 22 g protein  1 cup fresh spinach with 2 oz chicken, 1/3rd cup mandarin oranges, and 2 tablespoons sliced almonds with 1 tablespoon  vinaigrette dressing    Seven- 200 calories, 11 g protein  1 Tbsp sugar-free preserves and 1 Tbsp peanut butter on 1 slice whole grain toast    cup nonfat/lowfat Greek yogurt    Eight- 250 calories, 18 g protein  1 small soft-shell chicken taco with 1 oz shredded cheese, lettuce, tomato, salsa, and 1 Tbsp light sour cream    cup black beans    Nine- 225 calories, 13 g protein  2 ounces baked chicken  1/4 cup mashed potatoes    cup green beans    Ten- 200 calories, 21 g protein  Deli dunia: 2 oz roast beef or other deli meat with 1 tsp Abhi mayonnaise and sliced tomato, onion, and lettuce  1/3rd cup cottage cheese      Ten Dinners Under 300 calories    If you're eating a large breakfast and medium lunch, keep dinner small.  300-400 calories is ideal for most people depending on their caloric needs.    One- 300 calories, 12 g protein  1-inch thick slice of turkey meatloaf    cup baked butternut squash    Two- 200 calories, 9 g protein  Bread-less BLT: 3 slices turkey iglesias, sliced tomato, wrapped in a large lettuce leaf    cup peeled fruit    Three- 275 calories, 36 g protein  3 oz roasted chicken    cup cooked broccoli    cup shredded cheddar  cheese    cup unsweetened applesauce    Four- 200 calories, 25 g protein  3 oz baked tilapia  1/3rd cup cooked carrots    cup yogurt    Five- 250 calories, 20 g protein  Grilled ham  n  Swiss: spread 2 tsp ghee or butter on 1 slice of whole grain bread.  Cut bread in half, layer 2 oz deli ham with 1 piece of Swiss cheese and grill until cheese is melted.    cup cooked vegetables    Six- 250 calories, 18 g protein  Vegetarian cheeseburger: 1 [place] cheeseburger topped with lettuce, onion, tomato, and ketchup/mustard    cup sweet potato fries    Seven- 250 calories, 18 g protein  Pork pot roast: 2 oz roasted pork loin, 1/3rd cup roasted carrots,   medium potato, cooked with   cup gravy    Eight- 330 calories, 25 g protein  2 oz meatballs (about 2 small meatballs)    cup spaghetti sauce  1/2 piece toast topped with 1 tsp ghee or butterand topped with garlic powder, toasted in oven    Nine- 250 calories, 16 g protein  Mexican pizza: one 8  corn tortilla topped with 2 oz chicken,   cup salsa, 2 tablespoons black beans, 2 tablespoons shredded cheese.  Bake until cheese is melted.    Ten- 250 calories, 22 g protein  Shrimp stir-yen: 3 oz cooked shrimp, 1/6th onion,   pepper,   cup chopped carrots sautéed in 1 tablespoon olive oil, topped with 2 tablespoons stir yen sauce and a pinch of sesame seeds        150 Calories or Less Snack Ideas   1 hardboiled egg with   cup berries  1 small apple with 1 hardboiled egg  10 almonds with   cup berries  2 clementines with 1 light string cheese  1 light string cheese with   sliced apple  1 light string cheese wrapped in 2 slices of turkey  4 100% whole wheat crackers (e.g. Triscuit) with 1 light string cheese    c. cottage cheese with   cup fruit and 1 Tbsp sunflower seeds     cup cottage cheese with   of an avocado     can tuna fish with 1 cup sliced cucumbers     cup roasted garbanzo beans with paprika and cayenne pepper    baked sweet potato with   cup chili beans or   cup cottage  cheese  2 oz. nitrate free turkey slices with 1 cup carrots  1 container (6 oz) of low sugar (less than 10 grams of sugar) greek yogurt   3 Tablespoons of hummus with 1 cup sliced bell peppers   2 Tablespoons of hummus with 15 baby carrots  4 Tablespoons ranch dip made with plain Greek Yogurt and 3 mini cucumbers  1/4 cup nuts (any kind)  1 Tablespoon peanut butter with 1 stalk celery   1 dill pickle wrapped in 1-2 slices of deli ham with 1 tsp of mayonnaise/mustard.      Meals of 600-700kcal/meal 3x daily should be a good backbone to intake and then one or two smaller snacks of 100-150kcal/snack.

## 2024-11-04 NOTE — PROGRESS NOTES
Bariatric Clinic Follow-Up Visit:    Nnamdi Chavarria is a 19 year old  male with Body mass index is 52.47 kg/m .  presenting here today for follow-up on non-surgical efforts for weight loss.  Original Intake visit occurred on 7/14/23 with a weight of 408 lbs and BMI of 56.1 with co-morbid fatty liver disease.  Along with diet and behavior changes, he has been using metformin and naltrexone to assist his weight loss goals.  He had trouble with medication compliance early on/daily use and out our 10/27/23 follow up had had limited success in managing appetite well.  As such we attempted to ramp up Wegovy at that visit (shortages currently can impeded access/supply) and he had not been able to start it as of his 11/15/23 dietician visit. 2/28/24 he is gaining weight as a result of poor control of his hunger disorder. We discussed start of Zepbound (tirzepatide) given lack of axis to both Wegovy and Saxenda, previous poor efficacy of phentermine/topamax (qsymia) and liraglutide in the past.  He struggles with remembering meds at his age/cognitive delay issues and single weekly dose was thought to improve compliance/efficacy with his appetite suppressant.  Supply issues caused some lapse in his Zepbound therapy in April of 2024 and as of our 6/28/24 visit he was on 5mg/week and we planned to ramp to 10mg/week. As of our 11/4/24 visit he is  up to 10mg/week and down about 39 lbs from peak weight in February of 2024 and 9.3% total body weight reduction.     Dietician visits have continued, most recently 7/3/24 with RMR of 2730kcal/day and protein goal of 120-140g/day.       Weight:   Wt Readings from Last 5 Encounters:   11/04/24 (!) 173 kg (381 lb 8 oz)   03/13/24 (!) 186 kg (410 lb) (>99%, Z= 3.97)*   02/28/24 (!) 190.5 kg (420 lb) (>99%, Z= 4.03)*   11/15/23 (!) 181 kg (399 lb) (>99%, Z= 3.86)*   10/27/23 (!) 185.1 kg (408 lb) (>99%, Z= 3.91)*     * Growth percentiles are based on CDC (Boys, 2-20 Years) data.     pounds      Comorbidities:  Patient Active Problem List   Diagnosis    Severe obesity (BMI >= 40) (H)    Acanthosis nigricans    Developmental delay    History of seizures    Weakness of both lower extremities    Family history of chromosomal anomaly    Pain in both lower extremities    Scoliosis, unspecified scoliosis type, unspecified spinal region       Current Outpatient Medications:     tirzepatide-Weight Management (ZEPBOUND) 10 MG/0.5ML prefilled pen, Inject 0.5 mLs (10 mg) Subcutaneous every 7 days for 180 days, Disp: 6 mL, Rfl: 1    vitamin D2 (ERGOCALCIFEROL) 31185 units (1250 mcg) capsule, Take 50,000 Units by mouth once a week., Disp: , Rfl:     tirzepatide-Weight Management (ZEPBOUND) 7.5 MG/0.5ML prefilled pen, Inject 0.5 mLs (7.5 mg) subcutaneously every 7 days. (Patient not taking: Reported on 11/4/2024), Disp: 2 mL, Rfl: 0      Interim: Since our last visit, he has had excellent weight loss, down over 9% total body weight. Zepbound well tolerated/helpful at the 10mg/week dose and we'll plan to stay there this winter if continues to be efficacious/tolerated. We'll consider further upward titration if stalling out or lower efficacy.  Reviewed toolbox approach and will shoot for about 2200kca/day 120g/day protein and 120 oz of water daily.    Plan:   1.  Diet: aiming for 2200kcal/day with 120 grams of lean protein daily and 120 oz of water daily. Start meals with protein first approach to meals, consider a supplemental shake or smoothie if needed to get into the range needed. Bare minimum, keep intake above 1800-1900kcal/day to prevent too much muscle mass losses.    2. Exercise: aiming to get to the [place] 2x weekly if possible.     3. Medication: Zepbound 10mg/week going well, we'll stay at this dose for now.     4. Referral to Derm for neck rash above your acanthosis. Unclear what treatment is necessary for this.     5. Goals: down about 30 lbs from our introductory visit and nearly 40 lbs from peak  "weight earlier this year. Well done. Approaching a 10% total body weight reduction thus far. Aiming to continue seeing 1-2lbs/week of weight loss and get weight under 350 lbs this winter.       We discussed [facility] Bariatric Basics including:  -eating 3 meals daily  -reviewed metabolic needs for weight loss based on Resting Metabolic Rate  -protein goals supportive of healthy weight loss  -avoiding/limiting calorie containing beverages  -We discussed the importance of restorative sleep and stress management in maintaining a healthy weight.  -We discussed the National Weight Control Registry healthy weight maintenance strategies and ways to optimize metabolism.  -We discussed the importance of physical activity including cardiovascular and strength training in maintaining a healthier weight and explored viable options.      Most recent labs:  Lab Results   Component Value Date    WBC 8.5 07/14/2023    HGB 13.3 07/14/2023    HCT 37.9 (L) 07/14/2023    MCV 84 07/14/2023     07/14/2023     Lab Results   Component Value Date    CHOL 89 06/11/2020     Lab Results   Component Value Date    HDL 37 (L) 06/11/2020     No components found for: \"LDLCALC\"  Lab Results   Component Value Date    TRIG 141 (H) 06/11/2020     No results found for: \"CHOLHDL\"  Lab Results   Component Value Date    ALT 47 07/14/2023    AST 32 07/14/2023    ALKPHOS 58 07/14/2023     No results found for: \"HGBA1C\"  Lab Results   Component Value Date    B12 580 07/14/2023     No components found for: \"VITDT1\"  No results found for: \"SADIA\"  Lab Results   Component Value Date    PTHI 38 07/14/2023     No results found for: \"ZN\"  No results found for: \"VIB1WB\"  Lab Results   Component Value Date    TSH 1.79 07/14/2023     No results found for: \"TEST\"    DIETARY HISTORY  Less cravings/less late night snacking. More satiated. Hydrating OK. More protein awareness but couldn't name them so reviewed handout again.      PHYSICAL ACTIVITY " "PATTERNS:  Cardiovascular: [facility] access but not going recently. [place] [facility]  Strength Training: limited recently. Getting back to [facility].    REVIEW OF SYSTEMS  No upset stomach or vomiting. Minimal constipation complaints. No rash..  PHYSICAL EXAM:  Vitals: /68   Ht 1.816 m (5' 11.5\")   Wt (!) 173 kg (381 lb 8 oz)   BMI 52.47 kg/m    Weight:   Wt Readings from Last 3 Encounters:   11/04/24 (!) 173 kg (381 lb 8 oz)   03/13/24 (!) 186 kg (410 lb) (>99%, Z= 3.97)*   02/28/24 (!) 190.5 kg (420 lb) (>99%, Z= 4.03)*     * Growth percentiles are based on [facility] (Boys, 2-20 Years) data.         GEN: Pleasant, well groomed, in no acute distress  HEENT:  acanthosis nigricans to neck creases posteriorlaterally with vulgaris type rash with hyperkeratosis and some exoriation at nape of neck/hairline. No overt cellulitis but will refer to derm for evaluation ?HS .  NECK: No swelling.  HEART: RRR.  LUNGS: No respiratory difficulty noted. No cough. .  ABDOMEN: nontender/obese.  EXTREMITIES: No tremor. Ambulation is independent..  NEURO: Alert and Oriented X3, fluent speech. .  SKIN: No visible rashes. .    Interim study results:Last Comprehensive Metabolic Panel:  Sodium   Date Value Ref Range Status   07/14/2023 143 136 - 145 mmol/L Final   06/11/2020 141 133 - 143 mmol/L Final     Potassium   Date Value Ref Range Status   07/14/2023 4.1 3.4 - 5.3 mmol/L Final   06/11/2020 3.7 3.4 - 5.3 mmol/L Final     Chloride   Date Value Ref Range Status   07/14/2023 107 98 - 107 mmol/L Final   06/11/2020 110 98 - 110 mmol/L Final     Carbon Dioxide   Date Value Ref Range Status   06/11/2020 26 20 - 32 mmol/L Final     Carbon Dioxide (CO2)   Date Value Ref Range Status   07/14/2023 24 22 - 29 mmol/L Final     Anion Gap   Date Value Ref Range Status   07/14/2023 12 7 - 15 mmol/L Final   06/11/2020 5 3 - 14 mmol/L Final     Glucose   Date Value Ref Range Status   07/14/2023 106 (H) 70 - 99 mg/dL Final   06/11/2020 74 70 - 99 mg/dL Final     Urea " Nitrogen   Date Value Ref Range Status   07/14/2023 8.7 6.0 - 20.0 mg/dL Final   06/11/2020 10 7 - 21 mg/dL Final     Creatinine   Date Value Ref Range Status   07/14/2023 0.78 0.67 - 1.17 mg/dL Final   06/11/2020 0.80 0.50 - 1.00 mg/dL Final     GFR Estimate   Date Value Ref Range Status   07/14/2023 >90 >60 mL/min/1.73m2 Final   06/11/2020 GFR not calculated, patient <18 years old. >60 mL/min/[1.73_m2] Final     Comment:     Non  GFR Calc  Starting 12/18/2018, serum creatinine based estimated GFR (eGFR) will be   calculated using the Chronic Kidney Disease Epidemiology Collaboration   (CKD-EPI) equation.       Calcium   Date Value Ref Range Status   07/14/2023 9.4 8.6 - 10.0 mg/dL Final   06/11/2020 9.0 8.5 - 10.1 mg/dL Final     Bilirubin Total   Date Value Ref Range Status   07/14/2023 0.5 <=1.2 mg/dL Final     Alkaline Phosphatase   Date Value Ref Range Status   07/14/2023 58 40 - 129 U/L Final     ALT   Date Value Ref Range Status   07/14/2023 47 0 - 50 U/L Final     Comment:     Reference intervals for this test were updated on 6/12/2023 to more accurately reflect our healthy population. There may be differences in the flagging of prior results with similar values performed with this method. Interpretation of those prior results can be made in the context of the updated reference intervals.     06/11/2020 60 (H) 0 - 50 U/L Final     AST   Date Value Ref Range Status   07/14/2023 32 0 - 35 U/L Final     Comment:     Reference intervals for this test were updated on 6/12/2023 to more accurately reflect our healthy population. There may be differences in the flagging of prior results with similar values performed with this method. Interpretation of those prior results can be made in the context of the updated reference intervals.   06/11/2020 30 0 - 35 U/L Final               Lab Results   Component Value Date    A1C 5.3 07/14/2023    A1C 5.2 06/11/2020    A1C 4.8 06/13/2019    A1C 5.4 06/21/2018      Lab Results   Component Value Date    WBC 8.5 07/14/2023     Lab Results   Component Value Date    RBC 4.53 07/14/2023     Lab Results   Component Value Date    HGB 13.3 07/14/2023     Lab Results   Component Value Date    HCT 37.9 07/14/2023     Lab Results   Component Value Date    MCV 84 07/14/2023     Lab Results   Component Value Date    MCH 29.4 07/14/2023     Lab Results   Component Value Date    MCHC 35.1 07/14/2023     Lab Results   Component Value Date    RDW 12.7 07/14/2023     Lab Results   Component Value Date     07/14/2023     .  Recent Labs   Lab Test 06/11/20  1040 06/21/18  1501   CHOL 89 62   HDL 37* 34*   LDL 24 17   TRIG 141* 57           30 minutes spent by me on the date of the encounter doing chart review, history and exam, documentation and further activities per the note   Elias Palma MD  [facility]  7:58 AM  11/4/2024

## 2024-11-05 ENCOUNTER — TELEPHONE (OUTPATIENT)
Dept: DERMATOLOGY | Facility: CLINIC | Age: 19
End: 2024-11-05
Payer: COMMERCIAL

## 2024-11-05 NOTE — TELEPHONE ENCOUNTER
This encounter is being sent to inform the clinic that this patient has a referral from Elias Palma MD in [facility] GENERAL SUR[facility] BARI[facility]  for the diagnoses of Rash of neck; base of scalp/neckline boils/irritation of unclear origin if acne/reactive, follows with me for weight management. and has requested that this patient be seen within Priority: 1-2 Weeks and/or with  Priority: 1-2 Weeks. Based on the availability of our provider(s), we are unable to accommodate this request.    Were all sites offered this patient?  Yes  Pt's mom prefers [facility] in [place] please  Does scheduling algorithm request to schedule next available?  Patient has been scheduled for the first available opening with Becky Charlton PA-C at [facility] on 06/23/2025.  We have informed the patient that the clinic will review their referral and reach out if a sooner appointment is medically necessary.

## 2024-11-06 NOTE — TELEPHONE ENCOUNTER
VANE asking the patient to call the clinic back for a sooner appointment    Going to offer the patient Tuesday 11/12 at 1:00pm with Sharlene RUIZ CMA

## 2024-11-07 NOTE — TELEPHONE ENCOUNTER
FUTURE VISIT INFORMATION      FUTURE VISIT INFORMATION:  Date: 11/11/2024  Time: 12:15pm   Location: [address] [place] [address]   REFERRAL INFORMATION:  Referring provider: Elias Palma MD    Referring providers clinic:  [facility]   Reason for visit/diagnosis  Rash on back of scalp/neck     RECORDS REQUESTED FROM:       Clinic name Comments Records Status Imaging Status    OV with Gen Surgery Dr. Palma 11/4/2024  In EPIC

## 2024-11-11 ENCOUNTER — PRE VISIT (OUTPATIENT)
Dept: DERMATOLOGY | Facility: CLINIC | Age: 19
End: 2024-11-11

## 2024-11-11 ENCOUNTER — OFFICE VISIT (OUTPATIENT)
Dept: DERMATOLOGY | Facility: CLINIC | Age: 19
End: 2024-11-11
Payer: COMMERCIAL

## 2024-11-11 DIAGNOSIS — L73.0 ACNE NUCHAE KELOIDALIS: Primary | ICD-10-CM

## 2024-11-11 DIAGNOSIS — L83 ACANTHOSIS NIGRICANS: ICD-10-CM

## 2024-11-11 RX ORDER — FLUOCINOLONE ACETONIDE 0.11 MG/ML
OIL TOPICAL DAILY
Qty: 118.28 ML | Refills: 1 | Status: SHIPPED | OUTPATIENT
Start: 2024-11-11

## 2024-11-11 RX ORDER — CLINDAMYCIN PHOSPHATE 11.9 MG/ML
SOLUTION TOPICAL
Qty: 60 ML | Refills: 1 | Status: SHIPPED | OUTPATIENT
Start: 2024-11-11

## 2024-11-11 ASSESSMENT — PAIN SCALES - GENERAL: PAINLEVEL_OUTOF10: NO PAIN (0)

## 2024-11-11 NOTE — Clinical Note
Thank you for sending your patient to dermatology for evaluation. Hopefully we can help!  We appreciate your care of the patient.

## 2024-11-11 NOTE — LETTER
11/11/2024       RE: Nnamdi Chavarria  [address] [place] [address]     Dear Colleague,    Thank you for referring your patient, Nnamdi Chavarria, to the [facility]ERMATOLOG[facility] [place] [facility]. Please see a copy of my visit note below.    [facility] Dermatology Note  Encounter Date: Nov 11, 2024  Office Visit  - New patient,    Dermatology Problem List:  1.  Acanthosis nigricans  2.  Acne keloidalis nuchae    ____________________________________________    Assessment & Plan:     # Acne keloidalis nuchae  We reviewed unknown etiology, relation to irritation, tendency to scar and reactive hair follicles.  He is relatively asymptomatic but would prefer it not to progress.  Reviewed initiating treatment especially around times of hair grooming.  Discussed avoidance of short haircuts as able.  -Begin clindamycin 1% solution twice daily to affected areas during times of flare.  Continue daily intermittent use for prevention.  -RX: start Derma-Smoothe oil (fluocinolone 0.01%).  Apply to affected area on back of scalp at hairline twice daily in times of irritation or in preparation for short haircut.  Use up to 2 weeks consistently.  After burst of treatment reduce use to Monday through Friday to avoid any side effects or skin thinning, stretch marks.  Do not use continuously.    # Acanthosis nigricans  Discussed nature and etiology and relationship to insulin resistance.  Treatments are limited and relatively unsuccessful.  Given the proximity to location of acne keloidalis nuchae would defer treatment at this time.  Patient is in agreement with plan.    Procedures Performed:   None    Follow-up: 3 months in-person, or earlier for new or changing lesions or worsening rash.    Staff:     Alis Milan MD PhD  Dermatology, Dermatopathology    ____________________________________________    CC: Derm Problem (Rash on back of  scalp/neck, not itchy or painful. Noticed by weight loss doctor a couple weeks ago)      HPI:  Nnamdi Chavarria is a 19 year old female who presents today as a new patient for initial evaluation of rash on the posterior neck.  He is accompanied by his mom.    Rash has been there for some time but relatively asymptomatic.  Does not it, or bleed.  He notices small bumps on the back of the neck.  He does occasionally have short/shave haircuts.    This rash was discovered upon examination by his weight management physician.  He also notes the discoloration on the back of his neck that has been more longstanding.  He is wondering whether this process is infectious or contagious.    Mom reports history of personal fungal infections in the house previously treated with antifungals.  They have not tried any other creams or ointments.    Patient is otherwise feeling well, without additional skin concerns.     Labs Reviewed:  N/A    Physical Exam:  Vitals: There were no vitals taken for this visit.  SKIN: Focused examination of neck, face, scalp was performed.  -The posterior neck has thin velvety dark plaques.  -Lower neck and hairline there are scattered skin colored to slightly erythematous papules admixed within the follicular aspects of the hair.  This extends approximately 1 to 2 inches from the bottom of the hairline up into the hair.  There is minimal accompanying scale.  No bleeding or ulceration.      Medications:  Current Outpatient Medications   Medication Sig Dispense Refill     clindamycin (CLEOCIN T) 1 % external solution Apply twice daily to the affected areas of the scalp as needed for flares. 60 mL 1     fluocinolone acetonide (DERMA SMOOTHE/FS BODY) 0.01 % external oil Apply topically daily. Apply twice daily to the affected areas of the scalp as needed for flares or around the time of haircut. 118.28 mL 1     tirzepatide-Weight Management (ZEPBOUND) 10 MG/0.5ML prefilled pen Inject 0.5 mLs (10 mg)  Subcutaneous every 7 days for 180 days 6 mL 1     vitamin D2 (ERGOCALCIFEROL) 28115 units (1250 mcg) capsule Take 50,000 Units by mouth once a week.       No current facility-administered medications for this visit.      Past Medical History:   Patient Active Problem List   Diagnosis     Severe obesity (BMI >= 40) (H)     Acanthosis nigricans     Developmental delay     History of seizures     Weakness of both lower extremities     Family history of chromosomal anomaly     Pain in both lower extremities     Scoliosis, unspecified scoliosis type, unspecified spinal region       Past Medical History:   Diagnosis Date     Acanthosis nigricans 6/21/2018     Developmental delay 6/21/2018     Family history of chromosomal anomaly 3/1/2019     History of seizures 6/21/2018     Pain in both lower extremities 3/1/2019     Scoliosis, unspecified scoliosis type, unspecified spinal region 3/1/2019     Severe obesity (BMI >= 40) (H) 6/21/2018     Weakness of both lower extremities 6/21/2018     CC: JULIAN Palma      Again, thank you for allowing me to participate in the care of your patient.      Sincerely,    Alis Milan MD

## 2024-11-11 NOTE — PROGRESS NOTES
[facility] Dermatology Note  Encounter Date: Nov 11, 2024  Office Visit  - New patient,    Dermatology Problem List:  1.  Acanthosis nigricans  2.  Acne keloidalis nuchae    ____________________________________________    Assessment & Plan:     # Acne keloidalis nuchae  We reviewed unknown etiology, relation to irritation, tendency to scar and reactive hair follicles.  He is relatively asymptomatic but would prefer it not to progress.  Reviewed initiating treatment especially around times of hair grooming.  Discussed avoidance of short haircuts as able.  -Begin clindamycin 1% solution twice daily to affected areas during times of flare.  Continue daily intermittent use for prevention.  -RX: start Derma-Smoothe oil (fluocinolone 0.01%).  Apply to affected area on back of scalp at hairline twice daily in times of irritation or in preparation for short haircut.  Use up to 2 weeks consistently.  After burst of treatment reduce use to Monday through Friday to avoid any side effects or skin thinning, stretch marks.  Do not use continuously.    # Acanthosis nigricans  Discussed nature and etiology and relationship to insulin resistance.  Treatments are limited and relatively unsuccessful.  Given the proximity to location of acne keloidalis nuchae would defer treatment at this time.  Patient is in agreement with plan.    Procedures Performed:   None    Follow-up: 3 months in-person, or earlier for new or changing lesions or worsening rash.    Staff:     Alis Milan MD PhD  Dermatology, Dermatopathology    ____________________________________________    CC: Derm Problem (Rash on back of scalp/neck, not itchy or painful. Noticed by weight loss doctor a couple weeks ago)      HPI:  Nnamdi Chavarria is a 19 year old female who presents today as a new patient for initial evaluation of rash on the posterior neck.  He is accompanied by his mom.    Rash has been there for some time but relatively asymptomatic.   Does not it, or bleed.  He notices small bumps on the back of the neck.  He does occasionally have short/shave haircuts.    This rash was discovered upon examination by his weight management physician.  He also notes the discoloration on the back of his neck that has been more longstanding.  He is wondering whether this process is infectious or contagious.    Mom reports history of personal fungal infections in the house previously treated with antifungals.  They have not tried any other creams or ointments.    Patient is otherwise feeling well, without additional skin concerns.     Labs Reviewed:  N/A    Physical Exam:  Vitals: There were no vitals taken for this visit.  SKIN: Focused examination of neck, face, scalp was performed.  -The posterior neck has thin velvety dark plaques.  -Lower neck and hairline there are scattered skin colored to slightly erythematous papules admixed within the follicular aspects of the hair.  This extends approximately 1 to 2 inches from the bottom of the hairline up into the hair.  There is minimal accompanying scale.  No bleeding or ulceration.      Medications:  Current Outpatient Medications   Medication Sig Dispense Refill    clindamycin (CLEOCIN T) 1 % external solution Apply twice daily to the affected areas of the scalp as needed for flares. 60 mL 1    fluocinolone acetonide (DERMA SMOOTHE/FS BODY) 0.01 % external oil Apply topically daily. Apply twice daily to the affected areas of the scalp as needed for flares or around the time of haircut. 118.28 mL 1    tirzepatide-Weight Management (ZEPBOUND) 10 MG/0.5ML prefilled pen Inject 0.5 mLs (10 mg) Subcutaneous every 7 days for 180 days 6 mL 1    vitamin D2 (ERGOCALCIFEROL) 76140 units (1250 mcg) capsule Take 50,000 Units by mouth once a week.       No current facility-administered medications for this visit.      Past Medical History:   Patient Active Problem List   Diagnosis    Severe obesity (BMI >= 40) (H)    Acanthosis  nigricans    Developmental delay    History of seizures    Weakness of both lower extremities    Family history of chromosomal anomaly    Pain in both lower extremities    Scoliosis, unspecified scoliosis type, unspecified spinal region       Past Medical History:   Diagnosis Date    Acanthosis nigricans 6/21/2018    Developmental delay 6/21/2018    Family history of chromosomal anomaly 3/1/2019    History of seizures 6/21/2018    Pain in both lower extremities 3/1/2019    Scoliosis, unspecified scoliosis type, unspecified spinal region 3/1/2019    Severe obesity (BMI >= 40) (H) 6/21/2018    Weakness of both lower extremities 6/21/2018     CC: JULIAN Palma

## 2024-11-11 NOTE — PATIENT INSTRUCTIONS
This condition is called acne keloidalis nuchae.    Avoid picking, rubbing, or scratching the affected area  Discontinue close shaving, trimming, or razor or clipper edging of the posterior hairline  Avoid irritation from tight-fitting hats, helmets, or high-collared shirts    When you are having a flare, irritation or itching the area -we want you to start using the to topical medications.    Clindamycin is an antibiotic that can help reduce irritation from normal skin bacteria.  Derma-Smoothe is a steroid oil.  Use this for 2 weeks to the infected area morning and night to help reduce any inflammation.  If you are planning to have a short air cut, I would recommend using this before and around the time of her cut to prevent any worsening of the rash.      Please reach out to our clinic if this condition worsens or the treatment is not working.        The other rash on the neck that is a darkening of the skin is called acanthosis nigricans

## 2024-11-11 NOTE — NURSING NOTE
Chief Complaint   Patient presents with    Derm Problem     Rash on back of scalp/neck, not itchy or painful. Noticed by weight loss doctor a couple weeks ago     Itzel HEATH RN  Dermatology Surgery

## 2024-11-14 DIAGNOSIS — E66.813 CLASS 3 SEVERE OBESITY DUE TO EXCESS CALORIES WITH SERIOUS COMORBIDITY AND BODY MASS INDEX (BMI) OF 50.0 TO 59.9 IN ADULT (H): Primary | ICD-10-CM

## 2024-11-14 DIAGNOSIS — E88.810 METABOLIC SYNDROME X: ICD-10-CM

## 2024-11-14 DIAGNOSIS — E66.01 CLASS 3 SEVERE OBESITY DUE TO EXCESS CALORIES WITH SERIOUS COMORBIDITY AND BODY MASS INDEX (BMI) OF 50.0 TO 59.9 IN ADULT (H): Primary | ICD-10-CM

## 2024-11-14 RX ORDER — SEMAGLUTIDE 1 MG/.5ML
1 INJECTION, SOLUTION SUBCUTANEOUS
Qty: 2 ML | Refills: 0 | Status: SHIPPED | OUTPATIENT
Start: 2025-01-02 | End: 2025-01-30

## 2024-11-14 RX ORDER — SEMAGLUTIDE 2.4 MG/.75ML
2.4 INJECTION, SOLUTION SUBCUTANEOUS
Qty: 3 ML | Refills: 9 | Status: SHIPPED | OUTPATIENT
Start: 2025-02-27 | End: 2025-11-24

## 2024-11-14 RX ORDER — SEMAGLUTIDE 0.5 MG/.5ML
0.5 INJECTION, SOLUTION SUBCUTANEOUS
Qty: 2 ML | Refills: 0 | Status: SHIPPED | OUTPATIENT
Start: 2024-12-05 | End: 2025-01-02

## 2024-11-14 RX ORDER — SEMAGLUTIDE 1.7 MG/.75ML
1.7 INJECTION, SOLUTION SUBCUTANEOUS
Qty: 3 ML | Refills: 0 | Status: SHIPPED | OUTPATIENT
Start: 2025-01-30 | End: 2025-02-27

## 2024-11-15 ENCOUNTER — TELEPHONE (OUTPATIENT)
Dept: SURGERY | Facility: CLINIC | Age: 19
End: 2024-11-15
Payer: COMMERCIAL

## 2024-11-15 NOTE — TELEPHONE ENCOUNTER
Prior Authorization Retail Medication Request    Medication/Dose: Wegovy Auto-injectors--titrating doses.   New/renewal/insurance change PA/secondary ins. PA:  Previously Tried and Failed:  Was taking Zepbound but will no longer have coverage in the new year.  Rationale:  Transition to Wegovy one week after last dose of Zepbound due to insurance changes.  Ramping rx's sent to build from 0.5mg/week to 2.4mg/week doses over 4 months if tolerated. Will skip lowest dose as tolerating Zepbound well.  Previous approval for Wegovy  on 2024.    Key for 0.5 mg dose: [id number]  Key for 1 mg dose: [id number]  Key for 1.7 mg dose: [id number]  Key for 2.4 mg dose: [id number]    Clinic Information  Preferred routing pool for dept communication: [facility]

## 2024-11-19 ENCOUNTER — TELEPHONE (OUTPATIENT)
Dept: DERMATOLOGY | Facility: CLINIC | Age: 19
End: 2024-11-19

## 2024-11-19 NOTE — TELEPHONE ENCOUNTER
11/19 Left Voicemail (1st Attempt) and Sent Mychart (1st Attempt) for the patient to call back and schedule the following:    Appointment type: Return Dermatology  Provider: Bjorn  Return date: can offer 2/3 or 2/17   Specialty phone number: [phone]  Additional appointment(s) needed: n/a  Additonal Notes: n/a

## 2024-11-19 NOTE — TELEPHONE ENCOUNTER
PA Initiation    Medication: WEGOVY 0.5 MG/0.5ML SC SOAJ  Insurance Company: [employer] - Phone [phone] Fax [phone]  Pharmacy Filling the Rx:    Filling Pharmacy Phone:    Filling Pharmacy Fax:    Start Date: 11/19/2024

## 2024-11-20 NOTE — TELEPHONE ENCOUNTER
Prior Authorization Approval    Medication: WEGOVY 0.5 MG/0.5ML SC SOAJ  Authorization Effective Date: 11/20/2024  Authorization Expiration Date: 5/20/2025  Approved Dose/Quantity: 2/28  Reference #: [id number]   Insurance Company: [employer] - Phone [phone] Fax [phone]  Expected CoPay: $    CoPay Card Available:      Financial Assistance Needed:   Which Pharmacy is filling the prescription:    Pharmacy Notified: Yes  Patient Notified: Yes

## 2024-11-25 ENCOUNTER — TELEPHONE (OUTPATIENT)
Dept: DERMATOLOGY | Facility: CLINIC | Age: 19
End: 2024-11-25

## 2024-11-25 NOTE — TELEPHONE ENCOUNTER
11/25 Patient's mother confirmed scheduled appointment:  Date: 2/17/2025  Time: 1:15 pm  Visit type: Return Dermatology  Provider: Bjorn  Location: [facility]  Testing/imaging: n/a  Additional notes: n/a

## 2024-12-16 DIAGNOSIS — R79.89 LOW VITAMIN D LEVEL: Primary | ICD-10-CM

## 2024-12-16 RX ORDER — CHOLECALCIFEROL (VITAMIN D3) 50 MCG
1 TABLET ORAL DAILY
Qty: 90 TABLET | Refills: 0 | Status: SHIPPED | OUTPATIENT
Start: 2024-12-16

## 2025-06-06 ENCOUNTER — TELEPHONE (OUTPATIENT)
Dept: SURGERY | Facility: CLINIC | Age: 20
End: 2025-06-06
Payer: COMMERCIAL

## 2025-06-06 NOTE — TELEPHONE ENCOUNTER
Prior Authorization Retail Medication Request    Medication/Dose: Wegovy 2.4mg/0.75ml Auto-injectors.   New/renewal/insurance change PA/secondary ins. PA:  Previously Tried and Failed:  Pt has been using this medication.  Rationale:  Previous approval  on 2025    Weight on 10/27/2023 when started on Vaihui=899 lb, BMI 56.90  Current weight on 2025=message sent to pt     Insurance     Key: [id number]    Pharmacy Information (if different than what is on RX)  Name:  [facility]  Phone:  [phone]  Fax:  [phone]    Clinic Information  Preferred routing pool for dept communication: [facility]ariatric [facility]

## 2025-06-10 NOTE — TELEPHONE ENCOUNTER
Retail Pharmacy Prior Authorization Team   Phone: [phone]    PA Initiation    Medication: WEGOVY 2.4 MG/0.75ML SC SOAJ  Insurance Company: [employer] - Phone [phone] Fax [phone]  Pharmacy Filling the Rx: [id number] - [place], [place] - [facility] [address] AT [facility] [address]  Filling Pharmacy Phone: [phone]  Filling Pharmacy Fax:    Start Date: 6/10/2025    Waiting for updated weight to complete PA

## 2025-06-13 VITALS — HEIGHT: 72 IN | WEIGHT: 315 LBS | BODY MASS INDEX: 42.66 KG/M2

## 2025-06-13 NOTE — TELEPHONE ENCOUNTER
Weight on 10/27/2023 when started on GLP-1=408 lb, BMI 56.11  Current weight on 6/13/2025=373 lb, BMI 51.3  35 lb lost, 8.58%    Liane Kaur RN  [facility]  P [phone]  F [phone]

## 2025-06-16 NOTE — TELEPHONE ENCOUNTER
Prior Authorization Approval    Authorization Effective Date: 6/16/2025  Authorization Expiration Date: 6/16/2026  Medication: Wegovy 2.4mg/0.75ml Auto-injectors. -APPROVED  Reference #:     Insurance Company: [employer] - Phone [phone] Fax [phone]  Which Pharmacy is filling the prescription (Not needed for infusion/clinic administered): [id number] - [place], [place] - [address] AT [facility] [address]  Pharmacy Notified: Yes  Patient Notified: Instructed pharmacy to notify patient when script is ready to /ship.

## 2025-07-30 ENCOUNTER — LAB (OUTPATIENT)
Dept: LAB | Facility: CLINIC | Age: 20
End: 2025-07-30
Payer: COMMERCIAL

## 2025-07-30 ENCOUNTER — OFFICE VISIT (OUTPATIENT)
Dept: SURGERY | Facility: CLINIC | Age: 20
End: 2025-07-30
Payer: COMMERCIAL

## 2025-07-30 VITALS
HEIGHT: 72 IN | DIASTOLIC BLOOD PRESSURE: 74 MMHG | BODY MASS INDEX: 42.66 KG/M2 | WEIGHT: 315 LBS | SYSTOLIC BLOOD PRESSURE: 128 MMHG

## 2025-07-30 DIAGNOSIS — E88.810 METABOLIC SYNDROME X: ICD-10-CM

## 2025-07-30 DIAGNOSIS — E66.813 CLASS 3 SEVERE OBESITY DUE TO EXCESS CALORIES WITH SERIOUS COMORBIDITY AND BODY MASS INDEX (BMI) OF 50.0 TO 59.9 IN ADULT (H): ICD-10-CM

## 2025-07-30 DIAGNOSIS — E66.813 CLASS 3 SEVERE OBESITY DUE TO EXCESS CALORIES WITH BODY MASS INDEX (BMI) OF 50.0 TO 59.9 IN ADULT (H): Primary | ICD-10-CM

## 2025-07-30 DIAGNOSIS — E66.813 CLASS 3 SEVERE OBESITY DUE TO EXCESS CALORIES WITH BODY MASS INDEX (BMI) OF 50.0 TO 59.9 IN ADULT (H): ICD-10-CM

## 2025-07-30 LAB
ALBUMIN SERPL BCG-MCNC: 4.1 G/DL (ref 3.5–5.2)
ALP SERPL-CCNC: 59 U/L (ref 40–150)
ALT SERPL W P-5'-P-CCNC: 29 U/L (ref 0–70)
ANION GAP SERPL CALCULATED.3IONS-SCNC: 10 MMOL/L (ref 7–15)
AST SERPL W P-5'-P-CCNC: 22 U/L (ref 0–45)
BILIRUB SERPL-MCNC: 0.8 MG/DL
BUN SERPL-MCNC: 7.3 MG/DL (ref 6–20)
CALCIUM SERPL-MCNC: 9.2 MG/DL (ref 8.8–10.4)
CHLORIDE SERPL-SCNC: 109 MMOL/L (ref 98–107)
CREAT SERPL-MCNC: 1.04 MG/DL (ref 0.67–1.17)
EGFRCR SERPLBLD CKD-EPI 2021: >90 ML/MIN/1.73M2
GLUCOSE SERPL-MCNC: 95 MG/DL (ref 70–99)
HCO3 SERPL-SCNC: 26 MMOL/L (ref 22–29)
POTASSIUM SERPL-SCNC: 4.1 MMOL/L (ref 3.4–5.3)
PROT SERPL-MCNC: 7.1 G/DL (ref 6.4–8.3)
SODIUM SERPL-SCNC: 145 MMOL/L (ref 135–145)
VIT D+METAB SERPL-MCNC: 28 NG/ML (ref 20–50)

## 2025-07-30 PROCEDURE — 3074F SYST BP LT 130 MM HG: CPT | Performed by: EMERGENCY MEDICINE

## 2025-07-30 PROCEDURE — 80053 COMPREHEN METABOLIC PANEL: CPT

## 2025-07-30 PROCEDURE — 36415 COLL VENOUS BLD VENIPUNCTURE: CPT

## 2025-07-30 PROCEDURE — 3078F DIAST BP <80 MM HG: CPT | Performed by: EMERGENCY MEDICINE

## 2025-07-30 PROCEDURE — 82306 VITAMIN D 25 HYDROXY: CPT

## 2025-07-30 PROCEDURE — 99214 OFFICE O/P EST MOD 30 MIN: CPT | Performed by: EMERGENCY MEDICINE

## 2025-07-30 NOTE — PATIENT INSTRUCTIONS
Plan:   1.  Diet: To continue good weight loss, aiming for 2600kcal/day with 125 grams of lean protein and 100 oz of water daily to fuel this next phase.     2. Exercise: continue walking/sports or manual labor to keep muscles protected.    3. Medication: Wegovy 2.4mg/week to continue unless weight is stalled above 375 lbs over then next few months.     4. Recheck CMP and vitamin D levels today.    5. Goals: nearly 40 lbs down from peak weight thus far, well done. Aiming to get weight under 350 lbs by 2026.         Wegovy (semaglutide) is a very effective satiety boosting appetite suppressant. It needs to be ramped up slowly to be tolerated adequately.  About 1/10 people will not tolerate this medication. Each month, you move up to a higher dose until eventually reaching the 2.4mg/week dose over 5 months, if tolerating the ramping process well. When in plentiful supply, one try at re-ramping is recommended if the initial ramping has too many side effects/isn't tolerated well and if that attempt at  re-ramping isn't tolerated well, it's best to find an alternative.       Wegovy starts at 0.25mg/week for 4 weeks then increases to 0.5mg/week for 4 weeks then 1mg/week for 4 weeks then 1.7mg/week for 4 weeksthen 2.4mg/week usually for 6-9 months if tolerated well.  Injections can be given after cleansing the skin with alcohol prep pad or swab (available OTC).  Warming to room temperature 20-60 minutes prior to injection by placing on a table/counter reduces potential irritation at the time of injection.    Stop Wegovy if severe abdominal pain/vomiting/rash/throat swelling or constant nausea that prevents adequate food/water intake. Stop 2-3 weeks prior to any planned general anesthesia surgeries to reduce risk for something called a post operative ileus. If unexpected illness/injury that requires surgery, plan to go off Wegovy until fully recovered.    Gallstones can occur in about 1% of patients on this medication so  update me if increase right upper abdominal pain after eating.     Start meals with protein first, separate beverages from meals by 20 minutes and work hard in between meals to get your 64-75 oz of water daily to reduce risks for severe constipation. Consider a fiber supplement like powdered psyllium husk in 12 oz water each night.    For Prevention and Treatment of Constipation when on Semaglutide     From least aggressive to most aggressive:     Move: Wallking is essential - the more we move, the more our bowels move  Water: Drink water - 64oz-80oz per day suits most people well. About an one ounce of water per gram of protein eaten.  Go when you need to go. Don't wait. The longer you wait, the harder it gets.  Fiber: Fruit, raw veggies, nuts, whole grains, prune each night, flax/maricel seeds added into meals can all be helpful.   Stool Softeners: if constipation is mild and for maintenance  Gentle laxatives: Miralax, senokot, dulcolax , Smooth move tea as needed     More aggressive (and typically won't get to this point)  Milk of Magnesia to empty out. Don't go anywhere far from a toilet for 6 hours.  Mag Citrate (what you drink before a colonoscopy).   Suppositories  Enema      Check out [employer] for patient resources.      If you have weekends off, I recommend dosing Thursday or Friday evenings for best control over weekends and ability to ride out some transient side effects should they occur.    Some people starve on this medication if not mindful about food intake. I recommend starting meals with the protein part of your meal first, chew thoroughly and separate beverages from meals by about 20 minutes to make sure you get your nourishment in first. Include vegetables/complex carbohydrates and unsaturated fat as part of your balanced diet but group these at the end of the meal, after protein is mostly gone. Satiety will kick in too early if drinking too much with meals and under nourishment can result.  If  under nourished, more muscle mass losses and metabolic slowing will result and work against us in the long run.    It's not a bad idea to take a complete multivitamin most days of the week if using this medication. Low Iron formulas may be less constipating.    Pancreatitis is a very rare but potentially serious side effect. Stop Wegovy if severe mid abdominal pain/burning in nature or if unable to eat/drink due to severe nausea/discomfort.   People with strong history of pancreatitis without clear cause should stay clear of this medication as should those with strong personal or family history for medullary thyroid cancer or Multiple Endocrine Neoplasia (rare).     Kind Regards,  Elias Palma MD  [facility]      LEAN PROTEIN SOURCES  Getting 20-30 grams of protein, 3 meals daily, is appropriate for most people, some need more but more than about 40 grams per meal is not useful.  General rule is drinking one ounce of water per gram of protein eaten over the course of the day:  70 grams of protein each day, drink 70 oz of water.  Protein Source Portion Calories Grams of Protein                           Nonfat, plain Greek yogurt    (10 grams sugar or less) 3/4 cup (6 oz)  12-17   Light Yogurt (10 grams sugar or less) 3/4 cup (6 oz)  6-8   Protein Shake 1 shake 110-180 15-30   Skim/1% Milk or lactose-free milk 1 cup ( 8 oz)  8   Plain or light, flavored soymilk 1 cup  7-8   Plain or light, hemp milk 1 cup 110 6   Fat Free or 1% Cottage Cheese 1/2 cup 90 15   Part skim ricotta cheese 1/2 cup 100 14   Part skim or reduced fat cheese slices 1 ounce 65-80 8     Mozzarella String Cheese 1 80 8   Canned tuna, chicken, crab or salmon  (canned in water)  1/2 cup 100 15-20   White fish (broiled, grilled, baked) 3 ounces 100 21   [place]/Tuna (broiled, grilled, baked) 3 ounces 150-180 21   Shrimp, Scallops, Lobster, Crab 3 ounces 100 21   Pork loin, Pork  Tenderloin 3 ounces 150 21   Boneless, skinless chicken /turkey breast                          (broiled, grilled, baked) 3 ounces 120 21   [place], [place], [place], and Venison 3 ounces 120 21   Lean cuts of red meat and pork (sirloin,   round, tenderloin, flank, ground 93%-96%) 3 ounces 170 21   Lean or Extra Lean Ground Turkey 1/2 cup 150 20   90-95% Lean [place] Burger 1 lani 140-180 21   Low-fat casserole with lean meat 3/4 cup 200 17   Luncheon Meats                                                        (turkey, lean ham, roast beef, chicken) 3 ounces 100 21   Egg (boiled, poached, scrambled) 1 Egg 60 7   Egg Substitute 1/2 cup 70 10   Nuts (limit to 1 serving per day)  3 Tbsp. 150 7   Nut Millry (peanut, almond)  Limit to 1 serving or less daily 1 Tbsp. 90 4   Soy Burger (varies) 1  15   Garbanzo, Black, [place] Beans 1/2 cup 110 7   Refried Beans 1/2 cup 100 7   Kidney and Lima beans 1/2 cup 110 7   Tempeh 3 oz 175 18   Vegan crumbles 1/2 cup 100 14   Tofu 1/2 cup 110 14   Chili (beans and extra lean beef or turkey) 1 cup 200 23   Lentil Stew/Soup 1 cup 150 12   Black Bean Soup 1 cup 175 12       On-the-Go Breakfast Ideas  As of 2015, the latest research shows what a huge impact eating breakfast has on losing weight and feeling your best. People lose more weight when they make breakfast their biggest meal of the day compared to Dinner, but even if you cannot go to that degree, getting a breakfast that has at least 20 grams of protein and even a moderate amount of fat is ideal for maintaining good energy through the day and limits overeating in the evening hours.  The following are some quick and easy suggestions for at least getting something of substance into your body in the morning.  Enjoy!    Eating breakfast within 90 minutes of waking up is an important part of taking care of your body on a restricted calorie diet plan.  After sleeping for hours, your body is in need of fuel.  An ideal breakfast is a  combination of protein, whole-grain carbohydrates, or fruit.  Here s why:    -Protein digests very slowly in the body, helping you feel more satisfied.  -Whole grains provide dietary fiber, which also digests slowly and helps keep your gut clean.  -Fruit is a great source of vitamins, minerals, and fiber.     Each one of these breakfast combinations has between 200-300 calories and 15-20 grams of protein.  Feel free to mix and match!    Bone Broth (chicken bone broth or beef bone broth) is a great way to boost protein content. 8oz of bone broth will typically have 9-12grams of protein for 40kcal of energy.    Protein: Choose  -1/2 cup low-fat cottage cheese  -2 hard boiled eggs , or one cooked in olive oil (low/slow heat).  -1 low fat string cheese stick  -1 Tablespoon natural peanut butter  -Shot Stats vegetarian sausage lani (found in freezer section)  -1 slice lowfat cheese  -6 oz 2% or lowfat Greek yogurt, such as Fage or Oikos.    PLUS    Whole Grains:  Choose   -1 whole wheat English muffin  -1 whole wheat dunia, half  -1/2 Fiber One frozen muffin, thawed  -1/2 Fiber One toaster pastry  -1 whole wheat bagel thin  -1/2 cup Kashi cereal  -1 Kashi waffle (or other whole grain high-fiber waffle)  Aim for whole grain/sprouted breads with at least 3g of fiber/slice if having bread. [place] is one such brand.    OR    Fruit: Choose  -1/3 cup blueberries  -1/2 banana (or a plantain- similar to a banana, yet smaller)  -1/2 cup cantaloupe cubes  -1 small apple  -1 small orange  -1/2 cup strawberries  -handful raspberries/blackberries (each berry is about 1 calorie).    *Adapted from Diabetes Living, Fall 20    Ten Breakfasts Under 250 calories    Ideally, getting between 350-600 calories  (depending on starting height and weight)for breakfast is ideal for avoiding hunger later in the day, adjust/add to the following accordingly:    One- 250 calories, 8.5 g protein  1 slice whole-grain toast   1 Tbsp peanut  butter    banana    Two- 250 calories, 8 g protein    cup nonfat/lowfat yogurt  1/3rd cup diced no-sugar peaches  1/3rd cup cereal (like Special K, Cheerios, or bran flakes)    Three- 250 calories, 25 g protein  1 egg scrambled with 1 oz skim milk    cup shredded cheddar    whole grain English muffin  1 oz Greek iglesias  1 tsp margarine spread    Four- 225 calories, 25 g protein  1/2 cup Kashi Go-Lean cereal    cup skim milk mixed with 1 scoop Bariatric Advantage protein powder    cup no-sugar diced pears    Five- 250 calories, 20 g protein    cup oatmeal prepared with skim milk, 1 scoop protein powder, and sugar-free maple syrup    Six- 200 calories, 5 g protein  1 whole grain waffle, toasted  1 tablespoon creamy peanut or almond butter    Seven-  250 calories, 19 g protein  Breakfast sandwich: 1 slice whole grain toast, cut in half.  Add 1 scrambled egg and one slice cheddar  cheese.    Eight-  250 calories, 15 g protein  2 eggs scrambled with 1/3 cup frozen spinach (heat before adding to eggs) and 2 tablespoons low fat cream cheese.    Nine-  150 calories, 15 g protein  2/3rd cup cottage cheese    cup cantaloupe    Ten- 200 calories, 20 g protein  Fruit smoothie made with 4 oz. nonfat Greek yogurt,   cup berries, 1 scoop protein powder, and 4 oz skim milk.    Ten Lunches Under 250 Calories    Aim for lunch to be around 300-400 calories a day when trying to lose weight and get that protein in!    One- 200 calories, 11 g protein  1/3 cup tuna salad made with light vazquez on 1 slice whole grain bread  1 small peeled apple    Two- 250 calories, 16 g protein  1/3 cup lowfat cottage cheese    cup cooked green beans    small fruit cocktail (in natural juice)    Three- 200 calories, 11 g protein    grilled cheese sandwich on whole grain bread with lowfat cheese  2/3rd cup of tomato soup    Four- 250 calories, 22 g protein  Deli wrap: 1 oz sliced turkey, 1 oz sliced ham, 1 oz sliced chicken rolled up with 1 slice low-fat  cheese  1 small orange    Five- 250 calories, 28 g protein  2/3rd cup chili with 1 oz shredded cheese  4 saltine crackers    Six- 250 calories, 22 g protein  1 cup fresh spinach with 2 oz chicken, 1/3rd cup mandarin oranges, and 2 tablespoons sliced almonds with 1 tablespoon  vinaigrette dressing    Seven- 200 calories, 11 g protein  1 Tbsp sugar-free preserves and 1 Tbsp peanut butter on 1 slice whole grain toast    cup nonfat/lowfat Greek yogurt    Eight- 250 calories, 18 g protein  1 small soft-shell chicken taco with 1 oz shredded cheese, lettuce, tomato, salsa, and 1 Tbsp light sour cream    cup black beans    Nine- 225 calories, 13 g protein  2 ounces baked chicken  1/4 cup mashed potatoes    cup green beans    Ten- 200 calories, 21 g protein  Deli dunia: 2 oz roast beef or other deli meat with 1 tsp Abhi mayonnaise and sliced tomato, onion, and lettuce  1/3rd cup cottage cheese      Ten Dinners Under 300 calories    If you're eating a large breakfast and medium lunch, keep dinner small.  300-400 calories is ideal for most people depending on their caloric needs.    One- 300 calories, 12 g protein  1-inch thick slice of turkey meatloaf    cup baked butternut squash    Two- 200 calories, 9 g protein  Bread-less BLT: 3 slices turkey iglesias, sliced tomato, wrapped in a large lettuce leaf    cup peeled fruit    Three- 275 calories, 36 g protein  3 oz roasted chicken    cup cooked broccoli    cup shredded cheddar cheese    cup unsweetened applesauce    Four- 200 calories, 25 g protein  3 oz baked tilapia  1/3rd cup cooked carrots    cup yogurt    Five- 250 calories, 20 g protein  Grilled ham  n  Swiss: spread 2 tsp ghee or butter on 1 slice of whole grain bread.  Cut bread in half, layer 2 oz deli ham with 1 piece of Swiss cheese and grill until cheese is melted.    cup cooked vegetables    Six- 250 calories, 18 g protein  Vegetarian cheeseburger: 1 [place] cheeseburger topped with lettuce, onion, tomato, and  ketchup/mustard    cup sweet potato fries    Seven- 250 calories, 18 g protein  Pork pot roast: 2 oz roasted pork loin, 1/3rd cup roasted carrots,   medium potato, cooked with   cup gravy    Eight- 330 calories, 25 g protein  2 oz meatballs (about 2 small meatballs)    cup spaghetti sauce  1/2 piece toast topped with 1 tsp ghee or butterand topped with garlic powder, toasted in oven    Nine- 250 calories, 16 g protein  Mexican pizza: one 8  corn tortilla topped with 2 oz chicken,   cup salsa, 2 tablespoons black beans, 2 tablespoons shredded cheese.  Bake until cheese is melted.    Ten- 250 calories, 22 g protein  Shrimp stir-yen: 3 oz cooked shrimp, 1/6th onion,   pepper,   cup chopped carrots sautéed in 1 tablespoon olive oil, topped with 2 tablespoons stir yen sauce and a pinch of sesame seeds        150 Calories or Less Snack Ideas   1 hardboiled egg with   cup berries  1 small apple with 1 hardboiled egg  10 almonds with   cup berries  2 clementines with 1 light string cheese  1 light string cheese with   sliced apple  1 light string cheese wrapped in 2 slices of turkey  4 100% whole wheat crackers (e.g. Triscuit) with 1 light string cheese    c. cottage cheese with   cup fruit and 1 Tbsp sunflower seeds     cup cottage cheese with   of an avocado     can tuna fish with 1 cup sliced cucumbers     cup roasted garbanzo beans with paprika and cayenne pepper    baked sweet potato with   cup chili beans or   cup cottage cheese  2 oz. nitrate free turkey slices with 1 cup carrots  1 container (6 oz) of low sugar (less than 10 grams of sugar) greek yogurt   3 Tablespoons of hummus with 1 cup sliced bell peppers   2 Tablespoons of hummus with 15 baby carrots  4 Tablespoons ranch dip made with plain Greek Yogurt and 3 mini cucumbers  1/4 cup nuts (any kind)  1 Tablespoon peanut butter with 1 stalk celery   1 dill pickle wrapped in 1-2 slices of deli ham with 1 tsp of mayonnaise/mustard.      Nnamdi, meals of about  700-750kcal with 35 grams of lean protein would be ideal for this next phase of your weight loss. Three times daily and one snack daily.

## 2025-07-30 NOTE — PROGRESS NOTES
Bariatric Clinic Follow-Up Visit:    Nnamdi Chavarria is a 20 year old  male with Body mass index is 52.48 kg/m .  presenting here today for follow-up on non-surgical efforts for weight loss.  Original Intake visit occurred on 7/14/23 with a weight of 408 lbs and BMI of 56.1 with co-morbid fatty liver disease.  Along with diet and behavior changes, he has been using metformin and naltrexone to assist his weight loss goals.  He had trouble with medication compliance early on/daily use and out our 10/27/23 follow up had had limited success in managing appetite well.  As such we attempted to ramp up Wegovy at that visit (shortages currently can impeded access/supply) and he had not been able to start it as of his 11/15/23 dietician visit. 2/28/24 he is gaining weight as a result of poor control of his hunger disorder. We discussed start of Zepbound (tirzepatide) given lack of axis to both Wegovy and Saxenda, previous poor efficacy of phentermine/topamax (qsymia) and liraglutide in the past.  He struggles with remembering meds at his age/cognitive delay issues and single weekly dose was thought to improve compliance/efficacy with his appetite suppressant.  Supply issues caused some lapse in his Zepbound therapy in April of 2024 and as of our 6/28/24 visit he was on 5mg/week and we planned to ramp to 10mg/week. As of our 11/4/24 visit he was on 10mg/week and down about 39 lbs from peak weight in February of 2024 and 9.3% total body weight reduction.  Insurance required transition from Zepbound to Wegovy in 2025 and weight loss has stalled out in the last few months.      Dietician visits have continued, most recently 7/3/24 with RMR of 2730kcal/day and protein goal of 120-140g/day.     Weight:   Wt Readings from Last 5 Encounters:   07/30/25 (!) 173.1 kg (381 lb 9.6 oz)   06/13/25 (!) 169.2 kg (373 lb)   11/04/24 (!) 173 kg (381 lb 8 oz)   03/13/24 (!) 186 kg (410 lb) (>99%, Z= 3.97)*   02/28/24 (!) 190.5 kg (420 lb)  (>99%, Z= 4.03)*     * Growth percentiles are based on CDC (Boys, 2-20 Years) data.    pounds      Comorbidities:  Patient Active Problem List   Diagnosis    Severe obesity (BMI >= 40) (H)    Acanthosis nigricans    Developmental delay    History of seizures    Weakness of both lower extremities    Family history of chromosomal anomaly    Pain in both lower extremities    Scoliosis, unspecified scoliosis type, unspecified spinal region       Current Outpatient Medications:     Semaglutide-Weight Management (WEGOVY) 2.4 MG/0.75ML pen, Inject 2.4 mg subcutaneously every 7 days. 4 pens, Disp: 3 mL, Rfl: 9      Interim: Since our last visit, he has transitioned to Wegovy in April, now up to top dose. Weight has had hit some plateau since transition.       Plan:   1.  Diet: To continue good weight loss, aiming for 2600kcal/day with 125 grams of lean protein and 100 oz of water daily to fuel this next phase.     2. Exercise: continue walking/sports or manual labor to keep muscles protected.    3. Medication: Wegovy 2.4mg/week to continue unless weight is stalled above 375 lbs over then next few months.     4. Recheck CMP and vitamin D levels today.    5. Goals: nearly 40 lbs down from peak weight thus far, well done. Aiming to get weight under 350 lbs by 2026.       We discussed [facility] Basics including:  -eating 3 meals daily  -reviewed metabolic needs for weight loss based on Resting Metabolic Rate  -protein goals supportive of healthy weight loss  -avoiding/limiting calorie containing beverages  -We discussed the importance of restorative sleep and stress management in maintaining a healthy weight.  -We discussed the National Weight Control Registry healthy weight maintenance strategies and ways to optimize metabolism.  -We discussed the importance of physical activity including cardiovascular and strength training in maintaining a healthier weight and explored viable options.      Most recent labs:  Lab  "Results   Component Value Date    WBC 8.5 07/14/2023    HGB 13.3 07/14/2023    HCT 37.9 (L) 07/14/2023    MCV 84 07/14/2023     07/14/2023     Lab Results   Component Value Date    CHOL 89 06/11/2020     Lab Results   Component Value Date    HDL 37 (L) 06/11/2020     No components found for: \"LDLCALC\"  Lab Results   Component Value Date    TRIG 141 (H) 06/11/2020     No results found for: \"CHOLHDL\"  Lab Results   Component Value Date    ALT 47 07/14/2023    AST 32 07/14/2023    ALKPHOS 58 07/14/2023     No results found for: \"HGBA1C\"  Lab Results   Component Value Date    B12 580 07/14/2023     No components found for: \"VITDT1\"  No results found for: \"SADIA\"  Lab Results   Component Value Date    PTHI 38 07/14/2023     No results found for: \"ZN\"  No results found for: \"VIB1WB\"  Lab Results   Component Value Date    TSH 1.79 07/14/2023     No results found for: \"TEST\"    DIETARY HISTORY  Tracking technique: meal portions. No measuring currently.   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   Struggling With: mindfulness    Getting Adequate Protein: unsure.  Sleep schedule: n/a.      PHYSICAL ACTIVITY PATTERNS:  Cardiovascular: walks  Strength Training: limited    REVIEW OF SYSTEMS  Feels well. No pain/vomiting/rash or severe constipation.  PHYSICAL EXAM:  Vitals: /74 (BP Location: Right arm, Patient Position: Sitting, Cuff Size: Adult Regular)   Ht 1.816 m (5' 11.5\")   Wt (!) 173.1 kg (381 lb 9.6 oz)   BMI 52.48 kg/m    Weight:   Wt Readings from Last 3 Encounters:   07/30/25 (!) 173.1 kg (381 lb 9.6 oz)   06/13/25 (!) 169.2 kg (373 lb)   11/04/24 (!) 173 kg (381 lb 8 oz)         GEN: Pleasant, well groomed, in no acute distress. Baseline stoic affect. Accompanied by mother, Zaina.   HEENT:  normal facies .  NECK: No swelling.  HEART: .  LUNGS: No respiratory difficulty noted. No cough. .  ABDOMEN: .  EXTREMITIES: No tremor. " Ambulation is independent..  NEURO: Alert and Oriented X3, fluent speech. .  SKIN: No visible rashes. .    Interim study results: will recheck labs. .      30 minutes spent by me on the date of the encounter doing chart review, history and exam, documentation and further activities per the note   Elias Palma MD  [facility]  7:47 AM  7/30/2025

## 2025-07-30 NOTE — LETTER
7/30/2025      Nnamdi Chavarria  [address] [place] [address]      Dear Colleague,    Thank you for referring your patient, Nnamdi Chavarria, to the [facility] [place]. Please see a copy of my visit note below.    Bariatric Clinic Follow-Up Visit:    Nnamdi Chavarria is a 20 year old  male with Body mass index is 52.48 kg/m .  presenting here today for follow-up on non-surgical efforts for weight loss.  Original Intake visit occurred on 7/14/23 with a weight of 408 lbs and BMI of 56.1 with co-morbid fatty liver disease.  Along with diet and behavior changes, he has been using metformin and naltrexone to assist his weight loss goals.  He had trouble with medication compliance early on/daily use and out our 10/27/23 follow up had had limited success in managing appetite well.  As such we attempted to ramp up Wegovy at that visit (shortages currently can impeded access/supply) and he had not been able to start it as of his 11/15/23 dietician visit. 2/28/24 he is gaining weight as a result of poor control of his hunger disorder. We discussed start of Zepbound (tirzepatide) given lack of axis to both Wegovy and Saxenda, previous poor efficacy of phentermine/topamax (qsymia) and liraglutide in the past.  He struggles with remembering meds at his age/cognitive delay issues and single weekly dose was thought to improve compliance/efficacy with his appetite suppressant.  Supply issues caused some lapse in his Zepbound therapy in April of 2024 and as of our 6/28/24 visit he was on 5mg/week and we planned to ramp to 10mg/week. As of our 11/4/24 visit he was on 10mg/week and down about 39 lbs from peak weight in February of 2024 and 9.3% total body weight reduction.  Insurance required transition from Zepbound to Wegovy in 2025 and weight loss has stalled out in the last few months.      Dietician visits have continued, most recently 7/3/24 with RMR of 2730kcal/day and protein  goal of 120-140g/day.     Weight:   Wt Readings from Last 5 Encounters:   07/30/25 (!) 173.1 kg (381 lb 9.6 oz)   06/13/25 (!) 169.2 kg (373 lb)   11/04/24 (!) 173 kg (381 lb 8 oz)   03/13/24 (!) 186 kg (410 lb) (>99%, Z= 3.97)*   02/28/24 (!) 190.5 kg (420 lb) (>99%, Z= 4.03)*     * Growth percentiles are based on CDC (Boys, 2-20 Years) data.    pounds      Comorbidities:  Patient Active Problem List   Diagnosis     Severe obesity (BMI >= 40) (H)     Acanthosis nigricans     Developmental delay     History of seizures     Weakness of both lower extremities     Family history of chromosomal anomaly     Pain in both lower extremities     Scoliosis, unspecified scoliosis type, unspecified spinal region       Current Outpatient Medications:      Semaglutide-Weight Management (WEGOVY) 2.4 MG/0.75ML pen, Inject 2.4 mg subcutaneously every 7 days. 4 pens, Disp: 3 mL, Rfl: 9      Interim: Since our last visit, he has transitioned to Wegovy in April, now up to top dose. Weight has had hit some plateau since transition.       Plan:   1.  Diet: To continue good weight loss, aiming for 2600kcal/day with 125 grams of lean protein and 100 oz of water daily to fuel this next phase.     2. Exercise: continue walking/sports or manual labor to keep muscles protected.    3. Medication: Wegovy 2.4mg/week to continue unless weight is stalled above 375 lbs over then next few months.     4. Recheck CMP and vitamin D levels today.    5. Goals: nearly 40 lbs down from peak weight thus far, well done. Aiming to get weight under 350 lbs by 2026.       We discussed [facility]ariatric Basics including:  -eating 3 meals daily  -reviewed metabolic needs for weight loss based on Resting Metabolic Rate  -protein goals supportive of healthy weight loss  -avoiding/limiting calorie containing beverages  -We discussed the importance of restorative sleep and stress management in maintaining a healthy weight.  -We discussed the National Weight Control  "Registry healthy weight maintenance strategies and ways to optimize metabolism.  -We discussed the importance of physical activity including cardiovascular and strength training in maintaining a healthier weight and explored viable options.      Most recent labs:  Lab Results   Component Value Date    WBC 8.5 07/14/2023    HGB 13.3 07/14/2023    HCT 37.9 (L) 07/14/2023    MCV 84 07/14/2023     07/14/2023     Lab Results   Component Value Date    CHOL 89 06/11/2020     Lab Results   Component Value Date    HDL 37 (L) 06/11/2020     No components found for: \"LDLCALC\"  Lab Results   Component Value Date    TRIG 141 (H) 06/11/2020     No results found for: \"CHOLHDL\"  Lab Results   Component Value Date    ALT 47 07/14/2023    AST 32 07/14/2023    ALKPHOS 58 07/14/2023     No results found for: \"HGBA1C\"  Lab Results   Component Value Date    B12 580 07/14/2023     No components found for: \"VITDT1\"  No results found for: \"SADIA\"  Lab Results   Component Value Date    PTHI 38 07/14/2023     No results found for: \"ZN\"  No results found for: \"VIB1WB\"  Lab Results   Component Value Date    TSH 1.79 07/14/2023     No results found for: \"TEST\"    DIETARY HISTORY  Tracking technique: meal portions. No measuring currently.   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   Struggling With: mindfulness    Getting Adequate Protein: unsure.  Sleep schedule: n/a.      PHYSICAL ACTIVITY PATTERNS:  Cardiovascular: walks  Strength Training: limited    REVIEW OF SYSTEMS  Feels well. No pain/vomiting/rash or severe constipation.  PHYSICAL EXAM:  Vitals: /74 (BP Location: Right arm, Patient Position: Sitting, Cuff Size: Adult Regular)   Ht 1.816 m (5' 11.5\")   Wt (!) 173.1 kg (381 lb 9.6 oz)   BMI 52.48 kg/m    Weight:   Wt Readings from Last 3 Encounters:   07/30/25 (!) 173.1 kg (381 lb 9.6 oz)   06/13/25 (!) 169.2 kg (373 lb)   11/04/24 (!) 173 kg (381 lb " 8 oz)         GEN: Pleasant, well groomed, in no acute distress. Baseline stoic affect. Accompanied by mother, Zaina.   HEENT:  normal facies .  NECK: No swelling.  HEART: .  LUNGS: No respiratory difficulty noted. No cough. .  ABDOMEN: .  EXTREMITIES: No tremor. Ambulation is independent..  NEURO: Alert and Oriented X3, fluent speech. .  SKIN: No visible rashes. .    Interim study results: will recheck labs. .      30 minutes spent by me on the date of the encounter doing chart review, history and exam, documentation and further activities per the note   Elias Palma MD  [facility]  7:47 AM  7/30/2025    Again, thank you for allowing me to participate in the care of your patient.        Sincerely,        Elias Palma MD    Electronically signed

## 2025-08-06 ENCOUNTER — VIRTUAL VISIT (OUTPATIENT)
Dept: SURGERY | Facility: CLINIC | Age: 20
End: 2025-08-06
Payer: COMMERCIAL

## 2025-08-06 DIAGNOSIS — Z71.3 NUTRITIONAL COUNSELING: ICD-10-CM

## 2025-08-06 DIAGNOSIS — E66.01 MORBID OBESITY WITH BMI OF 50.0-59.9, ADULT (H): Primary | ICD-10-CM

## 2025-08-06 PROCEDURE — 97803 MED NUTRITION INDIV SUBSEQ: CPT | Mod: 95
